# Patient Record
Sex: FEMALE | Race: WHITE | Employment: OTHER | URBAN - METROPOLITAN AREA
[De-identification: names, ages, dates, MRNs, and addresses within clinical notes are randomized per-mention and may not be internally consistent; named-entity substitution may affect disease eponyms.]

---

## 2017-01-04 RX ORDER — POTASSIUM CHLORIDE 750 MG/1
10 TABLET, FILM COATED, EXTENDED RELEASE ORAL
Qty: 13 TAB | Refills: 5 | Status: SHIPPED | OUTPATIENT
Start: 2017-01-04 | End: 2017-07-18 | Stop reason: SDUPTHER

## 2017-01-04 NOTE — TELEPHONE ENCOUNTER
From: Mirian Price  To: Natalee Bender MD  Sent: 1/2/2017 12:52 PM EST  Subject: Medication Renewal Request    Original authorizing provider: MD Mirian Bess would like a refill of the following medications:  potassium chloride SR (KLOR-CON 10) 10 mEq tablet Natalee Bender MD]    Preferred pharmacy: [address] [place], [place] [address]tim Baltazar    Comment:

## 2017-02-16 DIAGNOSIS — M79.7 FIBROMYALGIA: ICD-10-CM

## 2017-02-16 RX ORDER — PREGABALIN 25 MG/1
25 CAPSULE ORAL 2 TIMES DAILY
Qty: 60 CAP | Refills: 0 | Status: SHIPPED | OUTPATIENT
Start: 2017-02-16 | End: 2017-03-17 | Stop reason: SDUPTHER

## 2017-02-16 NOTE — TELEPHONE ENCOUNTER
From: Zahira Brown  To: Jude Day NP  Sent: 2/16/2017 11:36 AM EST  Subject: Medication Renewal Request    Original authorizing provider: EVI Rivera would like a refill of the following medications:  pregabalin (LYRICA) 25 mg capsule Jude Day NP]    Preferred pharmacy: [place] [address] AT [address]    Comment:

## 2017-02-16 NOTE — TELEPHONE ENCOUNTER
Last office visit 6/14/16  Last refill 12/19/16    Have placed \" make appt before next refill\" in Rx.

## 2017-02-27 DIAGNOSIS — F41.9 ANXIETY: ICD-10-CM

## 2017-02-27 RX ORDER — ALPRAZOLAM 1 MG/1
1 TABLET ORAL
Qty: 30 TAB | Refills: 0 | Status: SHIPPED | OUTPATIENT
Start: 2017-02-27 | End: 2017-05-04 | Stop reason: SDUPTHER

## 2017-02-27 NOTE — TELEPHONE ENCOUNTER
From: Gay Wen  To: Joana Branch NP  Sent: 2/27/2017 7:33 AM EST  Subject: Medication Renewal Request    Original authorizing provider: EVI Khan would like a refill of the following medications:  ALPRAZolam Juan Mike) 1 mg tablet Joana Branch NP]    Preferred pharmacy: [address] [place] [address] [address]    Comment:

## 2017-03-17 ENCOUNTER — HOSPITAL ENCOUNTER (OUTPATIENT)
Dept: LAB | Age: 82
Discharge: HOME | End: 2017-03-17
Payer: MEDICARE

## 2017-03-17 ENCOUNTER — OFFICE VISIT (OUTPATIENT)
Dept: INTERNAL MEDICINE CLINIC | Age: 82
End: 2017-03-17

## 2017-03-17 VITALS
WEIGHT: 166 LBS | BODY MASS INDEX: 30.55 KG/M2 | HEIGHT: 62 IN | TEMPERATURE: 98.1 F | RESPIRATION RATE: 16 BRPM | SYSTOLIC BLOOD PRESSURE: 130 MMHG | OXYGEN SATURATION: 95 % | DIASTOLIC BLOOD PRESSURE: 62 MMHG | HEART RATE: 57 BPM
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
## 2017-03-17 DIAGNOSIS — M25.511 ACUTE PAIN OF RIGHT SHOULDER: Primary | ICD-10-CM

## 2017-03-17 DIAGNOSIS — I10 ESSENTIAL HYPERTENSION: ICD-10-CM

## 2017-03-17 DIAGNOSIS — E53.8 B12 DEFICIENCY: ICD-10-CM

## 2017-03-17 DIAGNOSIS — R07.89 RIGHT-SIDED CHEST WALL PAIN: ICD-10-CM

## 2017-03-17 DIAGNOSIS — M79.7 FIBROMYALGIA: ICD-10-CM

## 2017-03-17 PROCEDURE — 80053 COMPREHEN METABOLIC PANEL: CPT

## 2017-03-17 PROCEDURE — 85027 COMPLETE CBC AUTOMATED: CPT

## 2017-03-17 PROCEDURE — 82607 VITAMIN B-12: CPT

## 2017-03-17 PROCEDURE — 36415 COLL VENOUS BLD VENIPUNCTURE: CPT

## 2017-03-17 RX ORDER — TRAMADOL HYDROCHLORIDE 50 MG/1
TABLET ORAL
Refills: 0 | COMMUNITY
Start: 2017-03-09 | End: 2017-03-17 | Stop reason: ALTCHOICE

## 2017-03-17 RX ORDER — PREGABALIN 25 MG/1
25 CAPSULE ORAL 2 TIMES DAILY
Qty: 60 CAP | Refills: 2 | Status: SHIPPED | OUTPATIENT
Start: 2017-03-23 | End: 2017-06-21

## 2017-03-17 RX ORDER — TRAMADOL HYDROCHLORIDE 50 MG/1
50 TABLET ORAL
Qty: 20 TAB | Refills: 0 | Status: SHIPPED | OUTPATIENT
Start: 2017-03-17 | End: 2018-04-11 | Stop reason: ALTCHOICE

## 2017-03-17 NOTE — MR AVS SNAPSHOT
Visit Information Date & Time Provider Department Dept. Phone Encounter #  
 3/17/2017 12:15 PM Adeola Leon MD [facility] [phone] [id number] Upcoming Health Maintenance Date Due  
 GLAUCOMA SCREENING Q2Y 8/13/1989 OSTEOPOROSIS SCREENING (DEXA) 8/13/1989 Pneumococcal 65+ High/Highest Risk (2 of 2 - PPSV23) 8/9/2016 MEDICARE YEARLY EXAM 6/15/2017 DTaP/Tdap/Td series (2 - Td) 6/14/2026 Allergies as of 3/17/2017  Review Complete On: 6/14/2016 By: Adeola Leon MD  
  
 Severity Noted Reaction Type Reactions Influenza Tri-split 08-09 Vac High 03/27/2012   Systemic Other (comments) High fever 104degrees Codeine  04/14/2010    Shortness of Breath Darvon [Propoxyphene]  04/14/2010    Shortness of Breath Morphine  04/14/2010    Palpitations Pcn [Penicillins]  04/14/2010    Hives Percocet [Oxycodone-acetaminophen]  05/04/2015    Unknown (comments) Says unsure was a long time ago Current Immunizations  Reviewed on 9/12/2014 No immunizations on file. Not reviewed this visit You Were Diagnosed With   
  
 Codes Comments Acute pain of right shoulder    -  Primary ICD-10-CM: M25.511 ICD-9-CM: 719.41 Right-sided chest wall pain     ICD-10-CM: R07.89 ICD-9-CM: 786.52 Fall, subsequent encounter     ICD-10-CM: W19. Fred  ICD-9-CM: V58.89, E888.9 Fibromyalgia     ICD-10-CM: M79.7 ICD-9-CM: 729.1 B12 deficiency     ICD-10-CM: E53.8 ICD-9-CM: 266.2 Essential hypertension     ICD-10-CM: I10 
ICD-9-CM: 401.9 Vitals BP Pulse Temp Resp Height(growth percentile) Weight(growth percentile) 130/62 (BP 1 Location: Right arm, BP Patient Position: Sitting) (!) 57 98.1 °F (36.7 °C) (Oral) 16 5' 2\" (1.575 m) 166 lb (75.3 kg) SpO2 BMI OB Status Smoking Status 95% 30.36 kg/m2 Postmenopausal Former Smoker BMI and BSA Data  Body Mass Index Body Surface Area  
 30.36 kg/m 2 1.81 m 2  
  
  
 Preferred Pharmacy Pharmacy Name Phone [address], [address] Your Updated Medication List  
  
   
This list is accurate as of: 3/17/17 12:56 PM.  Always use your most recent med list.  
  
  
  
  
 albuterol 2.5 mg /3 mL (0.083 %) nebulizer solution Commonly known as:  PROVENTIL VENTOLIN  
1.5 mL by Nebulization route every four (4) hours as needed for Wheezing. ALPRAZolam 1 mg tablet Commonly known as:  Aylin Furnace Take 1 Tab by mouth nightly as needed for Anxiety for up to 30 doses. aspirin delayed-release 81 mg tablet Take 81 mg by mouth daily. atorvastatin 10 mg tablet Commonly known as:  LIPITOR Take 1 Tab by mouth daily. chlorthalidone 25 mg tablet Commonly known as:  Marzena Stakes Take 1 Tab by mouth daily. Cholecalciferol (Vitamin D3) 50,000 unit Cap Take 1 Cap by mouth every fourteen (14) days. Indications: VITAMIN D DEFICIENCY  
  
 FLORASTOR 250 mg capsule Generic drug:  Saccharomyces boulardii Take 250 mg by mouth daily. furosemide 20 mg tablet Commonly known as:  LASIX  
3 times a week. iron gly,fum-C-Q22-ov-kebxncce 150 mg iron-200 mg-250 mcg Tab Commonly known as:  Yuniel Ty As directed. meclizine 25 mg tablet Commonly known as:  ANTIVERT Take  by mouth three (3) times daily as needed. mometasone 50 mcg/actuation nasal spray Commonly known as:  NASONEX  
USE 2 SPRAYS IN BOTH NOSTRILS DAILY AS NEEDED  
  
 omeprazole 20 mg capsule Commonly known as:  PRILOSEC Take 1 Cap by mouth two (2) times a day. potassium chloride SR 10 mEq tablet Commonly known as:  KLOR-CON 10 Take 1 Tab by mouth every Monday, Wednesday, Friday. pregabalin 25 mg capsule Commonly known as:  Edman Speaker Take 1 Cap by mouth two (2) times a day for 90 days.  Max Daily Amount: 50 mg. Indications: please make an appointment before next refill. Start taking on:  3/23/2017  
  
 traMADol 50 mg tablet Commonly known as:  ULTRAM  
Take 1 Tab by mouth every eight (8) hours as needed for Pain for up to 20 doses. Max Daily Amount: 150 mg.  
  
 TYLENOL 325 mg tablet Generic drug:  acetaminophen Take  by mouth every four (4) hours as needed for Pain. Prescriptions Printed Refills  
 pregabalin (LYRICA) 25 mg capsule 2 Starting on: 3/23/2017 Sig: Take 1 Cap by mouth two (2) times a day for 90 days. Max Daily Amount: 50 mg. Indications: please make an appointment before next refill. Class: Print Route: Oral  
 traMADol (ULTRAM) 50 mg tablet 0 Sig: Take 1 Tab by mouth every eight (8) hours as needed for Pain for up to 20 doses. Max Daily Amount: 150 mg.  
 Class: Print Route: Oral  
  
We Performed the Following CBC W/O DIFF [09599 CPT(R)] METABOLIC PANEL, COMPREHENSIVE [71925 CPT(R)] VITAMIN B12 & FOLATE [33236 CPT(R)] Introducing [facility] SERVICES! Dear Yoselin Be: Thank you for requesting a Mapp account. Our records indicate that you already have an active Mapp account. You can access your account anytime at [email or web address] Did you know that you can access your hospital and ER discharge instructions at any time in Mapp? You can also review all of your test results from your hospital stay or ER visit. Additional Information If you have questions, please visit the Frequently Asked Questions section of the Mapp website at [email or web address] Remember, Mapp is NOT to be used for urgent needs. For medical emergencies, dial 911. Now available from your iPhone and Android! Please provide this summary of care documentation to your next provider. Your primary care clinician is listed as [place].  If you have any questions after today's visit, please call [phone].

## 2017-03-17 NOTE — PROGRESS NOTES
Chief Complaint   Patient presents with    Follow-up     follow up on lyrica, was only taking it once a day, and now she is taking at night and day. it helps like that. Patient fell two weeks ago and ended up at [facility]. Bruised chest wall.

## 2017-03-17 NOTE — PROGRESS NOTES
Written by Mark Dang, as dictated by Dr. Conrad Khan MD.    Waqar Ewing is a 80 y.o. female. HPI  The patient comes in today for a follow up on Lyrica. She fell 2 weeks ago when she was getting out of bed to go to the bathroom and one of the handles on the commode broke and she fell between the bed and the commode. She used her lifeline and they sent someone out to pick her up who picked her up from her shoulders. She is having some R shoulder pain from where she hit the commode. On Thursday she went to the ED with R sided pain. She has a chest wall contusion and they took XRs, but there were no broken bones. She still has pain when she breathes deep. She has been using the spirometer to help with her breathing to prevent pneumonia. She was given Tramadol for her pain, and she takes 1 in the morning. She puts Aspercreme on her BL knees because of her arthritis, and it helps her a lot. She has been using the Aspercreme and patches on her shoulder because of her pain. She takes Xanax at night. She is going through a life stressor right now because her brother is dying and they have called hospice in to be with him and she cannot go visit. Patient Active Problem List   Diagnosis Code    Hypertension I10    Hyperlipidemia E78.5    Bladder cancer ([facility] C67.9    DJD (degenerative joint disease) M19.90    Hip fracture ([facility] S72.009A    Knee pain, right M25.561        Current Outpatient Prescriptions on File Prior to Visit   Medication Sig Dispense Refill    ALPRAZolam (XANAX) 1 mg tablet Take 1 Tab by mouth nightly as needed for Anxiety for up to 30 doses. 30 Tab 0    chlorthalidone (HYGROTEN) 25 mg tablet Take 1 Tab by mouth daily. 30 Tab 2    potassium chloride SR (KLOR-CON 10) 10 mEq tablet Take 1 Tab by mouth every Monday, Wednesday, Friday. 13 Tab 5    omeprazole (PRILOSEC) 20 mg capsule Take 1 Cap by mouth two (2) times a day.  60 Cap 3    atorvastatin (LIPITOR) 10 mg tablet Take 1 Tab by mouth daily. 90 Tab 0    furosemide (LASIX) 20 mg tablet 3 times a week. 30 Tab 2    mometasone (NASONEX) 50 mcg/actuation nasal spray USE 2 SPRAYS IN BOTH NOSTRILS DAILY AS NEEDED 1 Container 2    iron gly,fum-C-J34-qt-vmudpbin (MAXARON FORTE) 150 mg iron-200 mg-250 mcg tab As directed. 90 Tab 2    meclizine (ANTIVERT) 25 mg tablet Take  by mouth three (3) times daily as needed.  Cholecalciferol, Vitamin D3, 50,000 unit cap Take 1 Cap by mouth every fourteen (14) days. Indications: VITAMIN D DEFICIENCY 6 Cap 4    albuterol (PROVENTIL VENTOLIN) 2.5 mg /3 mL (0.083 %) nebulizer solution 1.5 mL by Nebulization route every four (4) hours as needed for Wheezing. 24 Each 1    acetaminophen (TYLENOL) 325 mg tablet Take  by mouth every four (4) hours as needed for Pain.  saccharomyces boulardii (FLORASTOR) 250 mg capsule Take 250 mg by mouth daily.  aspirin delayed-release 81 mg tablet Take 81 mg by mouth daily. No current facility-administered medications on file prior to visit.         Allergies   Allergen Reactions    Influenza Tri-Split 08-09 Vac Other (comments)     High fever 104degrees    Codeine Shortness of Breath    Darvon [Propoxyphene] Shortness of Breath    Morphine Palpitations    Pcn [Penicillins] Hives    Percocet [Oxycodone-Acetaminophen] Unknown (comments)     Says unsure was a long time ago       Past Medical History:   Diagnosis Date    Acute on chronic diastolic heart failure ([address] 3/27/2012    Arthritis     Calculus of kidney     Cancer ([address]     bladder    Cataract     in 2007 and implant inserted left eye    Cataract     left eye    Chronic pain     GERD (gastroesophageal reflux disease)     Heart attack ([address]     in 2002    Hypercholesterolemia     Thromboembolus [facility])        Past Surgical History:   Procedure Laterality Date    BREAST SURGERY PROCEDURE UNLISTED      CARDIAC SURG PROCEDURE UNLIST stent inserted 2002    HX CHOLECYSTECTOMY      HX GYN       x 4    HX HEENT      HX ORTHOPAEDIC      HX UROLOGICAL      found small tumor    HX WRIST FRACTURE [place]  Oct 2014       Family History   Problem Relation Age of Onset    Heart Disease Mother     Hypertension Mother     Heart Disease Brother     Cancer Brother     Other Brother      ortho    Heart Disease Maternal Aunt     Arthritis-rheumatoid Maternal Aunt     Heart Disease Maternal Uncle     Arthritis-rheumatoid Maternal Uncle     Heart Disease Maternal Grandmother     Stroke Maternal Grandmother     Kidney Disease         Social History     Social History    Marital status:      Spouse name: N/A    Number of children: N/A    Years of education: N/A     Occupational History    Not on file. Social History Main Topics    Smoking status: Former Smoker    Smokeless tobacco: Never Used      Comment: quit     Alcohol use No    Drug use: No    Sexual activity: Not Currently      Comment:      Other Topics Concern    Not on file     Social History Narrative           Review of Systems   Constitutional: Negative for malaise/fatigue. HENT: Negative for congestion. Respiratory: Negative for cough and wheezing. Cardiovascular: Negative for chest pain and palpitations. Genitourinary: Negative for frequency. Musculoskeletal: Positive for falls and joint pain. Negative for myalgias. Neurological: Negative for weakness and headaches. Visit Vitals    /62 (BP 1 Location: Right arm, BP Patient Position: Sitting)    Pulse (!) 57    Temp 98.1 °F (36.7 °C) (Oral)    Resp 16    Ht 5' 2\" (1.575 m)    Wt 166 lb (75.3 kg)    SpO2 95%    BMI 30.36 kg/m2     Physical Exam   Constitutional: She is oriented to person, place, and time. She appears well-nourished. No distress.    HENT:   Right Ear: External ear normal.   Left Ear: External ear normal.   Mouth/Throat: Oropharynx is clear and moist.   Eyes: Conjunctivae and EOM are normal. Right eye exhibits no discharge. Left eye exhibits no discharge. Neck: Normal range of motion. Neck supple. Cardiovascular: Normal rate and regular rhythm. Pulmonary/Chest: Effort normal and breath sounds normal. She has no wheezes. Abdominal: Soft. Bowel sounds are normal. She exhibits no distension. Lymphadenopathy:     She has no cervical adenopathy. Neurological: She is alert and oriented to person, place, and time. Skin: Skin is intact. Psychiatric: She has a normal mood and affect. Nursing note and vitals reviewed. ASSESSMENT and PLAN    ICD-10-CM ICD-9-CM    1. Acute pain of right shoulder M25.511 719.41 traMADol (ULTRAM) 50 mg tablet script given to patient. She can layer tylenol with the tramadol. 2. Right-sided chest wall pain R07.89 786.52 traMADol (ULTRAM) 50 mg tablet script given to patient    I discussed she can continue to use the patches on her shoulder and take the Tramadol once a day. discussed that she needs to continue with the deep breathing exercises in order to prevent pneumonia. 3. Fall, subsequent encounter W19. XXXD V58.89 Recommended getting up slowly from the bed & using walker carefully during the day. E888.9    4. Fibromyalgia M79.7 729.1 pregabalin (LYRICA) 25 mg capsule script given to patient. Since this is helping her so much I will give her another refill. 5. B12 deficiency E53.8 266.2 VITAMIN B12 & FOLATE    I will give her a B12 injection today after we check her B12.    6. Essential hypertension T09 100.3 METABOLIC PANEL, COMPREHENSIVE      CBC W/O DIFF    I discussed that we will check her level today. This plan was reviewed with the patient and patient agrees. All questions were answered. This scribe documentation was reviewed by me and accurately reflects the examination and decisions made by me. This note will not be viewable in [address]

## 2017-03-18 LAB
ALBUMIN SERPL-MCNC: 4 G/DL (ref 3.2–4.6)
ALBUMIN/GLOB SERPL: 1.7 {RATIO} (ref 1.2–2.2)
ALP SERPL-CCNC: 91 IU/L (ref 39–117)
ALT SERPL-CCNC: 5 IU/L (ref 0–32)
AST SERPL-CCNC: 12 IU/L (ref 0–40)
BILIRUB SERPL-MCNC: 0.5 MG/DL (ref 0–1.2)
BUN SERPL-MCNC: 34 MG/DL (ref 10–36)
BUN/CREAT SERPL: 21 (ref 11–26)
CALCIUM SERPL-MCNC: 9 MG/DL (ref 8.7–10.3)
CHLORIDE SERPL-SCNC: 100 MMOL/L (ref 96–106)
CO2 SERPL-SCNC: 25 MMOL/L (ref 18–29)
CREAT SERPL-MCNC: 1.59 MG/DL (ref 0.57–1)
ERYTHROCYTE [DISTWIDTH] IN BLOOD BY AUTOMATED COUNT: 14.2 % (ref 12.3–15.4)
FOLATE SERPL-MCNC: 10.8 NG/ML
GLOBULIN SER CALC-MCNC: 2.3 G/DL (ref 1.5–4.5)
GLUCOSE SERPL-MCNC: 88 MG/DL (ref 65–99)
HCT VFR BLD AUTO: 35.9 % (ref 34–46.6)
HGB BLD-MCNC: 12.1 G/DL (ref 11.1–15.9)
INTERPRETATION: NORMAL
MCH RBC QN AUTO: 30.4 PG (ref 26.6–33)
MCHC RBC AUTO-ENTMCNC: 33.7 G/DL (ref 31.5–35.7)
MCV RBC AUTO: 90 FL (ref 79–97)
PLATELET # BLD AUTO: 205 X10E3/UL (ref 150–379)
POTASSIUM SERPL-SCNC: 4.3 MMOL/L (ref 3.5–5.2)
PROT SERPL-MCNC: 6.3 G/DL (ref 6–8.5)
RBC # BLD AUTO: 3.98 X10E6/UL (ref 3.77–5.28)
SODIUM SERPL-SCNC: 140 MMOL/L (ref 134–144)
VIT B12 SERPL-MCNC: 790 PG/ML (ref 211–946)
WBC # BLD AUTO: 9.4 X10E3/UL (ref 3.4–10.8)

## 2017-03-21 NOTE — PROGRESS NOTES
Spoke to patient after making 2 identifications, made her aware of her lab results and the need to drink more water. Patient was upset on the phone when I asked what was wrong she stated her brother  last night and she can't go be with him. Asked patient if there was anything she needed which she replied no to. I will call patient back in a few days to check up on her.

## 2017-03-21 NOTE — PROGRESS NOTES
pls call her & find out how is she doing? If Aspercreme helping her knees. ? Her kidney functions are getting worse. She needs to drink 6-7 glasses of water.

## 2017-03-27 RX ORDER — ATORVASTATIN CALCIUM 10 MG/1
10 TABLET, FILM COATED ORAL DAILY
Qty: 90 TAB | Refills: 0 | Status: SHIPPED | OUTPATIENT
Start: 2017-03-27 | End: 2017-10-21 | Stop reason: SDUPTHER

## 2017-03-27 NOTE — TELEPHONE ENCOUNTER
From: Renato Andrews  To: Krystin Mcnair NP  Sent: 3/25/2017 12:37 PM EDT  Subject: Medication Renewal Request    Original authorizing provider: EVI Leung would like a refill of the following medications:  atorvastatin (LIPITOR) 10 mg tablet Krystin Mcnair NP]    Preferred pharmacy: [address] [place], [place] [address]tim Baltazar    Comment:

## 2017-04-07 DIAGNOSIS — E55.9 VITAMIN D DEFICIENCY: ICD-10-CM

## 2017-04-07 DIAGNOSIS — E53.8 B12 DEFICIENCY: ICD-10-CM

## 2017-04-07 RX ORDER — ASPIRIN 325 MG
50000 TABLET, DELAYED RELEASE (ENTERIC COATED) ORAL
Qty: 6 CAP | Refills: 4 | Status: SHIPPED | OUTPATIENT
Start: 2017-04-07 | End: 2018-01-01 | Stop reason: SDUPTHER

## 2017-04-07 RX ORDER — MAGNESIUM 200 MG
1000 TABLET ORAL DAILY
Qty: 90 TAB | Refills: 3 | Status: SHIPPED | OUTPATIENT
Start: 2017-04-07 | End: 2018-01-01 | Stop reason: SDUPTHER

## 2017-04-18 ENCOUNTER — OFFICE VISIT (OUTPATIENT)
Dept: INTERNAL MEDICINE CLINIC | Age: 82
End: 2017-04-18

## 2017-04-18 VITALS
TEMPERATURE: 98.4 F | HEART RATE: 58 BPM | HEIGHT: 62 IN | OXYGEN SATURATION: 98 % | BODY MASS INDEX: 30.73 KG/M2 | WEIGHT: 167 LBS | DIASTOLIC BLOOD PRESSURE: 84 MMHG | SYSTOLIC BLOOD PRESSURE: 122 MMHG | RESPIRATION RATE: 18 BRPM

## 2017-04-18 DIAGNOSIS — I10 ESSENTIAL HYPERTENSION: ICD-10-CM

## 2017-04-18 DIAGNOSIS — M17.0 PRIMARY OSTEOARTHRITIS OF BOTH KNEES: Primary | ICD-10-CM

## 2017-04-18 DIAGNOSIS — M25.511 CHRONIC PAIN OF BOTH SHOULDERS: ICD-10-CM

## 2017-04-18 DIAGNOSIS — G89.29 CHRONIC PAIN OF BOTH SHOULDERS: ICD-10-CM

## 2017-04-18 DIAGNOSIS — M25.512 CHRONIC PAIN OF BOTH SHOULDERS: ICD-10-CM

## 2017-04-18 NOTE — TELEPHONE ENCOUNTER
From: Lou Freeman  To: Lamont Mak NP  Sent: 4/17/2017 4:42 PM EDT  Subject: Medication Renewal Request    Original authorizing provider: EVI Hansen would like a refill of the following medications:  chlorthalidone (HYGROTEN) 25 mg tablet Lamont Mak NP]    Preferred pharmacy: [address] [place], [place] [address]tim Baltazar    Comment:

## 2017-04-18 NOTE — PROGRESS NOTES
Chief Complaint   Patient presents with    Knee Pain     knee injections     1. Have you been to the ER, urgent care clinic since your last visit? Hospitalized since your last visit? No    2. Have you seen or consulted any other health care providers outside of the [address] [place] [address] since your last visit? Include any pap smears or colon screening.  No

## 2017-04-18 NOTE — PROGRESS NOTES
Written by Sylvie Hughes, as dictated by Dr. Florentin Lange MD.    Allan Kaur is a 80 y.o. female. HPI  The patient comes in today C/O BL knee pain. Her shoulder pain is much better with the Aspercreme patches. Her creatinine was high at 1.59 on her last blood work. She had taken a diuretic the day prior to blood work. She is trying to drink more water. Her B.p readings have been running well within the normal range since on Hygroten. She is taking Lasix 2-3 times a week. Knee has been hurting a lot lately. She cannot take too many pain medication because of her elevated creatinine and her fall risk. She has been getting cortisol injections in the past with Dr. Kristina Conroy, but she does not want to go back to the orthopedist office because she does not want a knee replacement due to her age and quality of life. Patient Active Problem List   Diagnosis Code    Hypertension I10    Hyperlipidemia E78.5    Bladder cancer ([facility] [address] C67.9    DJD (degenerative joint disease) M19.90    Hip fracture ([facility] [address] S72.009A    Knee pain, right M25.561        Current Outpatient Prescriptions on File Prior to Visit   Medication Sig Dispense Refill    Cholecalciferol, Vitamin D3, 50,000 unit cap Take 1 Cap by mouth every fourteen (14) days. Indications: VITAMIN D DEFICIENCY 6 Cap 4    cyanocobalamin (VITAMIN B-12) 1,000 mcg sublingual tablet Take 1 Tab by mouth daily. 90 Tab 3    atorvastatin (LIPITOR) 10 mg tablet Take 1 Tab by mouth daily. 90 Tab 0    pregabalin (LYRICA) 25 mg capsule Take 1 Cap by mouth two (2) times a day for 90 days. Max Daily Amount: 50 mg. Indications: please make an appointment before next refill. 60 Cap 2    traMADol (ULTRAM) 50 mg tablet Take 1 Tab by mouth every eight (8) hours as needed for Pain for up to 20 doses. Max Daily Amount: 150 mg. 20 Tab 0    ALPRAZolam (XANAX) 1 mg tablet Take 1 Tab by mouth nightly as needed for Anxiety for up to 30 doses.  27 Tab 0    potassium chloride SR (KLOR-CON 10) 10 mEq tablet Take 1 Tab by mouth every Monday, Wednesday, Friday. 13 Tab 5    omeprazole (PRILOSEC) 20 mg capsule Take 1 Cap by mouth two (2) times a day. 60 Cap 3    furosemide (LASIX) 20 mg tablet 3 times a week. 30 Tab 2    mometasone (NASONEX) 50 mcg/actuation nasal spray USE 2 SPRAYS IN BOTH NOSTRILS DAILY AS NEEDED 1 Container 2    iron gly,fum-C-I23-ku-oveizbwc (MAXARON FORTE) 150 mg iron-200 mg-250 mcg tab As directed. 90 Tab 2    meclizine (ANTIVERT) 25 mg tablet Take  by mouth three (3) times daily as needed.  albuterol (PROVENTIL VENTOLIN) 2.5 mg /3 mL (0.083 %) nebulizer solution 1.5 mL by Nebulization route every four (4) hours as needed for Wheezing. 24 Each 1    acetaminophen (TYLENOL) 325 mg tablet Take  by mouth every four (4) hours as needed for Pain.  saccharomyces boulardii (FLORASTOR) 250 mg capsule Take 250 mg by mouth daily.  aspirin delayed-release 81 mg tablet Take 81 mg by mouth daily.  chlorthalidone (HYGROTEN) 25 mg tablet Take 1 Tab by mouth daily. 30 Tab 2     No current facility-administered medications on file prior to visit.         Allergies   Allergen Reactions    Influenza Tri-Split 08-09 Vac Other (comments)     High fever 104degrees    Codeine Shortness of Breath    Darvon [Propoxyphene] Shortness of Breath    Morphine Palpitations    Pcn [Penicillins] Hives    Percocet [Oxycodone-Acetaminophen] Unknown (comments)     Says unsure was a long time ago       Past Medical History:   Diagnosis Date    Acute on chronic diastolic heart failure ([address] 3/27/2012    Arthritis     Calculus of kidney     Cancer ([address]     bladder    Cataract     in 2007 and implant inserted left eye    Cataract     left eye    Chronic pain     GERD (gastroesophageal reflux disease)     Heart attack ([address]     in 2002    Hypercholesterolemia     Thromboembolus [facility])        Past Surgical History:   Procedure Laterality Date  BREAST SURGERY PROCEDURE UNLISTED      CARDIAC SURG PROCEDURE UNLIST      stent inserted 2002    HX CHOLECYSTECTOMY      HX GYN       x 4    HX HEENT      HX ORTHOPAEDIC      HX UROLOGICAL      found small tumor    HX WRIST FRACTURE [place]  Oct 2014       Family History   Problem Relation Age of Onset    Heart Disease Mother     Hypertension Mother     Heart Disease Brother     Cancer Brother     Other Brother      ortho    Heart Disease Maternal Aunt     Arthritis-rheumatoid Maternal Aunt     Heart Disease Maternal Uncle     Arthritis-rheumatoid Maternal Uncle     Heart Disease Maternal Grandmother     Stroke Maternal Grandmother     Kidney Disease Other        Social History     Social History    Marital status:      Spouse name: N/A    Number of children: N/A    Years of education: N/A     Occupational History    Not on file. Social History Main Topics    Smoking status: Former Smoker    Smokeless tobacco: Never Used      Comment: quit     Alcohol use No    Drug use: No    Sexual activity: Not Currently      Comment:      Other Topics Concern    Not on file     Social History Narrative       Office Visit on 2017   Component Date Value Ref Range Status    Vitamin B12 2017 790  211 - 946 pg/mL Final    Folate 2017 10.8  >3.0 ng/mL Final    Comment: A serum folate concentration of less than 3.1 ng/mL is  considered to represent clinical deficiency.       Glucose 2017 88  65 - 99 mg/dL Final    BUN 2017 34  10 - 36 mg/dL Final    Creatinine 2017 1.59* 0.57 - 1.00 mg/dL Final    GFR est non-AA 2017 28* >59 mL/min/1.73 Final    GFR est AA 2017 32* >59 mL/min/1.73 Final    BUN/Creatinine ratio 2017 21  11 - 26 Final    Sodium 2017 140  134 - 144 mmol/L Final    Potassium 2017 4.3  3.5 - 5.2 mmol/L Final    Chloride 2017 100  96 - 106 mmol/L Final    CO2 2017 25  18 - 29 mmol/L Final    Calcium 03/17/2017 9.0  8.7 - 10.3 mg/dL Final    Protein, total 03/17/2017 6.3  6.0 - 8.5 g/dL Final    Albumin 03/17/2017 4.0  3.2 - 4.6 g/dL Final    GLOBULIN, TOTAL 03/17/2017 2.3  1.5 - 4.5 g/dL Final    A-G Ratio 03/17/2017 1.7  1.2 - 2.2 Final                  **Please note reference interval change**    Bilirubin, total 03/17/2017 0.5  0.0 - 1.2 mg/dL Final    Alk. phosphatase 03/17/2017 91  39 - 117 IU/L Final    AST (SGOT) 03/17/2017 12  0 - 40 IU/L Final    ALT (SGPT) 03/17/2017 5  0 - 32 IU/L Final    WBC 03/17/2017 9.4  3.4 - 10.8 x10E3/uL Final    RBC 03/17/2017 3.98  3.77 - 5.28 x10E6/uL Final    HGB 03/17/2017 12.1  11.1 - 15.9 g/dL Final    HCT 03/17/2017 35.9  34.0 - 46.6 % Final    MCV 03/17/2017 90  79 - 97 fL Final    MCH 03/17/2017 30.4  26.6 - 33.0 pg Final    MCHC 03/17/2017 33.7  31.5 - 35.7 g/dL Final    RDW 03/17/2017 14.2  12.3 - 15.4 % Final    PLATELET 06/74/4879 504  150 - 379 x10E3/uL Final    Interpretation 03/17/2017 Note   Final    Supplement report is available. Review of Systems   Constitutional: Negative for malaise/fatigue. HENT: Negative for congestion. Respiratory: Negative for cough and wheezing. Cardiovascular: Negative for chest pain and palpitations. Genitourinary: Negative for frequency. Musculoskeletal: Positive for joint pain. Negative for myalgias. Neurological: Negative for weakness and headaches. Visit Vitals    /84    Pulse (!) 58    Temp 98.4 °F (36.9 °C) (Oral)    Resp 18    Ht 5' 2\" (1.575 m)    Wt 167 lb (75.8 kg)    SpO2 98%    BMI 30.54 kg/m2     Physical Exam   Constitutional: She is oriented to person, place, and time. She appears well-nourished. No distress. HENT:   Right Ear: External ear normal.   Left Ear: External ear normal.   Mouth/Throat: Oropharynx is clear and moist.   Eyes: Conjunctivae and EOM are normal. Right eye exhibits no discharge. Left eye exhibits no discharge. Neck: Normal range of motion. Neck supple. Cardiovascular: Normal rate and regular rhythm. Pulmonary/Chest: Effort normal and breath sounds normal. She has no wheezes. Abdominal: Soft. Bowel sounds are normal. She exhibits no distension. Musculoskeletal: She exhibits tenderness. BL crepitus knees  ROM limited due to pain   Lymphadenopathy:     She has no cervical adenopathy. Neurological: She is alert and oriented to person, place, and time. Skin: Skin is intact. Psychiatric: She has a normal mood and affect. Nursing note and vitals reviewed. ASSESSMENT and PLAN    ICD-10-CM ICD-9-CM    1. Primary osteoarthritis of both knees M17.0 715.16  Informed consent Obtained. Time out: Immediately prior to procedure a \"time out\" was called to verify the correct patient, procedure, equipment, support staff and site/side marked as required. Informed consent obtained. Under aseptic measure 1% lidocaine & 1% epinephrine was injected on R and L lower lateral side of patella to numb the area. 25 gauge needle was used ,no fluid came out. Depo-cortisol injected into BL lower lateral patella. No complication during this procedure. I suggested that she does warm compresses on her knees to help move the steroids around in her knee. 2. Essential hypertension I10 401.9 Her BP is well under control today. No change to dosage. This plan was reviewed with the patient and patient agrees. All questions were answered. This scribe documentation was reviewed by me and accurately reflects the examination and decisions made by me. This note will not be viewable in [address]

## 2017-04-19 RX ORDER — CHLORTHALIDONE 25 MG/1
25 TABLET ORAL DAILY
Qty: 30 TAB | Refills: 2 | Status: SHIPPED | OUTPATIENT
Start: 2017-04-19 | End: 2017-06-28 | Stop reason: ALTCHOICE

## 2017-05-04 DIAGNOSIS — F41.9 ANXIETY: ICD-10-CM

## 2017-05-04 RX ORDER — ALPRAZOLAM 1 MG/1
1 TABLET ORAL
Qty: 30 TAB | Refills: 0 | Status: SHIPPED | OUTPATIENT
Start: 2017-05-04 | End: 2017-06-24 | Stop reason: SDUPTHER

## 2017-05-04 NOTE — TELEPHONE ENCOUNTER
From: Tiffany Guzman  To: Otf Gill NP  Sent: 5/4/2017 10:15 AM EDT  Subject: Medication Renewal Request    Original authorizing provider: EVI Donaldson would like a refill of the following medications:  ALPRAZolam [place]) 1 mg tablet Otf Gill NP]    Preferred pharmacy: [address] [place], [place] [address]tim Baltazar    Comment:

## 2017-05-12 DIAGNOSIS — R42 VERTIGO: Primary | ICD-10-CM

## 2017-05-12 RX ORDER — MECLIZINE HYDROCHLORIDE 25 MG/1
25 TABLET ORAL
Qty: 90 TAB | Refills: 1 | Status: SHIPPED | OUTPATIENT
Start: 2017-05-12

## 2017-06-13 RX ORDER — OMEPRAZOLE 20 MG/1
20 CAPSULE, DELAYED RELEASE ORAL 2 TIMES DAILY
Qty: 60 CAP | Refills: 3 | Status: SHIPPED | OUTPATIENT
Start: 2017-06-13 | End: 2018-01-11 | Stop reason: SDUPTHER

## 2017-06-13 NOTE — TELEPHONE ENCOUNTER
From: Karin Castle  To: Tia Paz MD  Sent: 6/12/2017 3:56 PM EDT  Subject: Medication Renewal Request    Original authorizing provider: MD Karin Benedict would like a refill of the following medications:  omeprazole (PRILOSEC) 20 mg capsule Tia Paz MD]    Preferred pharmacy: [facility]    Comment:

## 2017-06-28 ENCOUNTER — OFFICE VISIT (OUTPATIENT)
Dept: INTERNAL MEDICINE CLINIC | Age: 82
End: 2017-06-28

## 2017-06-28 VITALS
HEIGHT: 62 IN | SYSTOLIC BLOOD PRESSURE: 114 MMHG | HEART RATE: 88 BPM | TEMPERATURE: 97.6 F | RESPIRATION RATE: 14 BRPM | WEIGHT: 153 LBS | OXYGEN SATURATION: 96 % | DIASTOLIC BLOOD PRESSURE: 66 MMHG | BODY MASS INDEX: 28.16 KG/M2

## 2017-06-28 DIAGNOSIS — I10 ESSENTIAL HYPERTENSION: ICD-10-CM

## 2017-06-28 DIAGNOSIS — Z00.00 MEDICARE ANNUAL WELLNESS VISIT, SUBSEQUENT: Primary | ICD-10-CM

## 2017-06-28 RX ORDER — PREGABALIN 25 MG/1
CAPSULE ORAL
Refills: 2 | COMMUNITY
Start: 2017-06-23 | End: 2017-07-19 | Stop reason: SINTOL

## 2017-06-28 RX ORDER — KETOROLAC TROMETHAMINE 5 MG/ML
SOLUTION OPHTHALMIC
Refills: 6 | COMMUNITY
Start: 2017-05-11

## 2017-06-28 RX ORDER — FLUTICASONE PROPIONATE 50 MCG
2 SPRAY, SUSPENSION (ML) NASAL DAILY
COMMUNITY
End: 2018-01-01 | Stop reason: ALTCHOICE

## 2017-06-28 RX ORDER — AMLODIPINE BESYLATE 2.5 MG/1
2.5 TABLET ORAL DAILY
Qty: 30 TAB | Refills: 2 | Status: SHIPPED | OUTPATIENT
Start: 2017-06-28 | End: 2017-09-26

## 2017-06-28 NOTE — PROGRESS NOTES
1. Have you been to the ER, urgent care clinic since your last visit? Hospitalized since your last visit? No    2. Have you seen or consulted any other health care providers outside of the [address] [place] [address] since your last visit? Include any pap smears or colon screening. No    Chief Complaint   Patient presents with   [facility] Annual Wellness Visit     Medicare wellness     Not fasting. Has not had DEXA screening.

## 2017-06-28 NOTE — PROGRESS NOTES
NN Medicare Wellness Visit      Opal Lopez is a 80 y.o. female and presents for Annual Medicare Wellness Visit. Assessment of cognitive impairment: Alert and oriented x 4. Depression Screen:   PHQ over the last two weeks 4/18/2017   Little interest or pleasure in doing things Not at all   Feeling down, depressed or hopeless Not at all   Total Score PHQ 2 0       Fall Risk Assessment:    Fall Risk Assessment, last 12 mths 6/28/2017   Able to walk? Yes   Fall in past 12 months? No   Fall with injury? -   Number of falls in past 12 months -   Fall Risk Score -       Abuse Screen:   Abuse Screening Questionnaire 6/28/2017   Do you ever feel afraid of your partner? N   Are you in a relationship with someone who physically or mentally threatens you? N   Is it safe for you to go home? Y       Activities of Daily Living:  Partial assistance. Requires assistance with: bathing and hygiene and no ADLs  Patient handle his/her own medications  yes Use of pill box  yes  Activities of Daily Living:   ADL Assessment 6/28/2017   Feeding yourself No Help Needed   Getting from bed to chair Help Needed   Getting dressed No Help Needed   Bathing or showering Help Needed   Walk across the room (includes cane/walker) Help Needed   Using the telphone No Help Needed   Taking your medications Help Needed   Preparing meals Help Needed   Managing money (expenses/bills) No Help Needed   Moderately strenuous housework (laundry) Help Needed   Shopping for personal items (toiletries/medicines) Help Needed   Shopping for groceries Help Needed   Driving Help Needed   Climbing a flight of stairs Help Needed   Getting to places beyond walking distances Help Needed       Health Maintenance:  Daily Aspirin: yes  Bone Density: pt decline  Glaucoma Screening: yes, 6/16/17  Immunizations:    Tetanus: patient declines. Influenza: patient declines. Shingles: pt declined. PPSV-23: pt declines. Prevnar-13: pt declines.     Cancer screening: Cervical: n/a. Breast: pt over 74. Colon: pt declines. Alcohol Risk Screen:   On any occasion during the past 3 months, have you had more than 3 drinks(female) or 4 drinks (male) containing alcohol in one? No  Do you average more than 7 drinks (female) or 14 drinks (male) per week? No  Type and amount:n/a    Hearing Loss:  wears hearing aides ( left and right)      Vision Loss:   Wears glasses, contact lenses, or have any other visual impairment  Yes, reading glasses    Adult Nutrition Screen:   pt reports reduces bread intake, lost some weight, pt reports sometimes chokes , pt feel like she is swallowing ok    Advance Care Planning:   End of Life Planning: pt says she did AMD with Marilia Snare last year? Will need to check record  Crow Dowell ACP-Facilitator appointment no      Medications/Allergies: Reviewed with patient  Prior to Admission medications    Medication Sig Start Date End Date Taking? Authorizing Provider   fluticasone (FLONASE) 50 mcg/actuation nasal spray 2 Sprays by Both Nostrils route daily. Yes Historical Provider   LYRICA 25 mg capsule  6/23/17  Yes Historical Provider   ketorolac (ACULAR) 0.5 % ophthalmic solution PLACE 1 GTT INTO OS D 5/11/17  Yes Historical Provider   ALPRAZolam (XANAX) 1 mg tablet Take 1 Tab by mouth nightly as needed for Anxiety for up to 30 doses. 6/26/17  Yes Rogers Barbosa NP   omeprazole (PRILOSEC) 20 mg capsule Take 1 Cap by mouth two (2) times a day. 6/13/17  Yes Alisia Watts NP   meclizine (ANTIVERT) 25 mg tablet Take 1 Tab by mouth three (3) times daily as needed. Chewable tabs 5/12/17  Yes Rogers Barbosa NP   chlorthalidone (HYGROTEN) 25 mg tablet Take 1 Tab by mouth daily. 4/19/17  Yes Alisia Watts NP   Cholecalciferol, Vitamin D3, 50,000 unit cap Take 1 Cap by mouth every fourteen (14) days.  Indications: VITAMIN D DEFICIENCY 4/7/17  Yes Rogers Barbosa NP   cyanocobalamin (VITAMIN B-12) 1,000 mcg sublingual tablet Take 1 Tab by mouth daily. 4/7/17  Yes Lissett Coats NP   atorvastatin (LIPITOR) 10 mg tablet Take 1 Tab by mouth daily. 3/27/17  Yes Lissett Coats NP   potassium chloride SR (KLOR-CON 10) 10 mEq tablet Take 1 Tab by mouth every Monday, Wednesday, Friday. 1/4/17  Yes Bernarda Hernandez MD   furosemide (LASIX) 20 mg tablet 3 times a week. 9/23/16  Yes Bernarda Hernandez MD   albuterol (PROVENTIL VENTOLIN) 2.5 mg /3 mL (0.083 %) nebulizer solution 1.5 mL by Nebulization route every four (4) hours as needed for Wheezing. 1/12/16  Yes Bernarda Hernandez MD   acetaminophen (TYLENOL) 325 mg tablet Take  by mouth every four (4) hours as needed for Pain. Yes Historical Provider   saccharomyces boulardii (FLORASTOR) 250 mg capsule Take 250 mg by mouth daily. Yes Historical Provider   aspirin delayed-release 81 mg tablet Take 81 mg by mouth daily. Yes Historical Provider   traMADol (ULTRAM) 50 mg tablet Take 1 Tab by mouth every eight (8) hours as needed for Pain for up to 20 doses. Max Daily Amount: 150 mg. 3/17/17   Bernarda Hernandez MD   mometasone (NASONEX) 50 mcg/actuation nasal spray USE 2 SPRAYS IN BOTH NOSTRILS DAILY AS NEEDED 9/14/16   Bernarda Hernandez MD   iron gly,fum-C-Q25-ht-bjgthlzd (MAXARON FORTE) 150 mg iron-200 mg-250 mcg tab As directed. 7/20/16   Bernarda Hernandez MD       Allergies   Allergen Reactions    Influenza Tri-Split 08-09 Vac Other (comments)     High fever 104degrees    Codeine Shortness of Breath    Darvon [Propoxyphene] Shortness of Breath    Morphine Palpitations    Pcn [Penicillins] Hives    Percocet [Oxycodone-Acetaminophen] Unknown (comments)     Says unsure was a long time ago       Objective:  Visit Vitals    /66 (BP 1 Location: Left arm, BP Patient Position: Sitting)    Pulse 88    Temp 97.6 °F (36.4 °C) (Oral)    Resp 14    Ht 5' 2\" (1.575 m)    Wt 153 lb (69.4 kg)    SpO2 96%    BMI 27.98 kg/m2    Body mass index is 27.98 kg/(m^2).     Problem List: Reviewed with patient and discussed risk factors. Patient Active Problem List   Diagnosis Code    Hypertension I10    Hyperlipidemia E78.5    Bladder cancer ([facility] [address] C67.9    DJD (degenerative joint disease) M19.90    Hip fracture ([facility] [address] S72.009A    Knee pain, right M25.561       PSH: Reviewed with patient  Past Surgical History:   Procedure Laterality Date    BREAST SURGERY PROCEDURE UNLISTED      CARDIAC SURG PROCEDURE UNLIST      stent inserted 2002    HX CHOLECYSTECTOMY      HX GYN       x 4    HX HEENT      HX ORTHOPAEDIC      HX UROLOGICAL      found small tumor    HX WRIST FRACTURE [address]  Oct 2014        SH: Reviewed with patient  Social History   Substance Use Topics    Smoking status: Former Smoker    Smokeless tobacco: Never Used      Comment: quit     Alcohol use No       FH: Reviewed with patient  Family History   Problem Relation Age of Onset    Heart Disease Mother     Hypertension Mother     Heart Disease Brother     Cancer Brother     Other Brother      ortho    Heart Disease Maternal Aunt     Arthritis-rheumatoid Maternal Aunt     Heart Disease Maternal Uncle     Arthritis-rheumatoid Maternal Uncle     Heart Disease Maternal Grandmother     Stroke Maternal Grandmother     Kidney Disease Other        Current medical providers:    Patient Care Team:  Yobani Pedraza MD as PCP - General  Dr. Parul Omalley, urologist.    Plan:    Ld Hernandez was seen today for annual wellness visit. Diagnoses and all orders for this visit:    Medicare annual wellness visit, subsequent    Essential hypertension  -     amLODIPine (NORVASC) 2.5 mg tablet; Take 1 Tab by mouth daily for 90 days. Chlorthalidone discontinued due to elevated creatinine & low readings.         Orders Placed This Encounter    fluticasone (FLONASE) 50 mcg/actuation nasal spray    LYRICA 25 mg capsule    ketorolac (ACULAR) 0.5 % ophthalmic solution       Health Maintenance   Topic Date Due    GLAUCOMA SCREENING Q2Y  1989    OSTEOPOROSIS SCREENING (DEXA)  08/13/1989    MEDICARE YEARLY EXAM  06/15/2017    INFLUENZA AGE 9 TO ADULT  08/01/2017    DTaP/Tdap/Td series (2 - Td) 06/14/2026    ZOSTER VACCINE AGE 60>  Addressed    Pneumococcal 65+ High/Highest Risk  Addressed          Urinary/ Fecal Incontinence: sometimes per pt. Regular physical exercise: none due to knee osteoarthritis. Patient verbalized understanding of information presented. AVS and Medicare Part B Preventive Services Table printed and given to pt and reviewed. See table for findings under Recommendation and Scheduled. All of the patient's questions were answered.

## 2017-06-28 NOTE — PATIENT INSTRUCTIONS
Today's Date - 6/28/17  Medicare Part B Preventive Services Limitations Recommendation/Date completed if known Scheduled/ Next Due   Bone Mass Measurement  (age 72 & older, biennial) Requires diagnosis related to osteoporosis or estrogen deficiency. Biennial benefit unless patient has history of long-term glucocorticoid tx or baseline is needed because initial test was by other method Completed:      Recommended every 2 years DUE: pt declined   Cardiovascular Screening Blood Tests (every 5 years)  Total cholesterol, HDL, Triglycerides Order as a panel if possible Completed:      Recommended annually DUE:due now   Colorectal Cancer Screening  -Fecal occult blood test (annual)  -Flexible sigmoidoscopy (5y)  -Screening colonoscopy (10y)  -Barium Enema  Completed:        Recommended every 10 years DUE over 74:   Counseling to Prevent Tobacco Use (up to 8 sessions per year)  - Counseling greater than 3 and up to 10 minutes  - Counseling greater than 10 minutes Patients must be asymptomatic of tobacco-related conditions to receive as preventive service  n/a   Prevnar 13 vaccine       Pt declined     TDAP Vaccine     Pt declined   Shingles Vaccine         Pt declined   Influenza Vaecine        Due fall   Pneumonia Vaccine       Pt declined   Diabetes Screening Tests (at least every 3 years, Medicare covers annually or at 6-month intervals for prediabetic patients)    Fasting blood sugar (FBS) or glucose tolerance test (GTT)       Patient must be diagnosed with one of the following:  -Hypertension, Dyslipidemia, obesity, previous impaired FBS or GTT  Or any two of the following: overweight, FH of diabetes, age ? 72, history of gestational diabetes, birth of baby weighing more than 9 pounds Completed:        Recommended annually DUE: n/a   Diabetes Self-Management Training (DSMT) (no USPSTF recommendation) Requires referral by treating physician for patient with diabetes or renal disease.  10 hours of initial DSMT session of no less than 30 minutes each in a continuous 12-month period. 2 hours of follow-up DSMT in subsequent years. n/a   Glaucoma Screening (no USPSTF recommendation) Diabetes mellitus, family history, , age 48 or over,  American, age 72 or over Completed:        Recommended annually DUE: due now   Human Immunodeficiency Virus (HIV) Screening (annually for increased risk patients)  HIV-1 and HIV-2 by EIA, NEDRA, rapid antibody test, or oral mucosa transudate Patient must be at increased risk for HIV infection per USPSTF guidelines or pregnant. Tests covered annually for patients at increased risk. Pregnant patients may receive up to 3 test during pregnancy. n/a   Medical Nutrition Therapy (MNT) (fordiabetes or renal disease not recommended schedule) Requires referral by treating physician for patient with diabetes or renal disease. Can be provided in same year as diabetes self-management training (DSMT), and CMS recommends medical nutrition therapy take place after DSMT. Up to 3 hours for initial year and 2 hours in subsequent years. n/a   Hepatitis B Vaccinations (if medium/high risk) Medium/high risk factors:  End-stage renal disease,  Hemophiliacs who received Factor VIII or IX concentrates, Clients of institutions for the mentally retarded, Persons who live in the same house as a HepB virus carrier, Homosexual men, Illicit injectable drug abusers. n/a   Screening Mammography (biennial age 54-69)? Annually (age 36 or over) Completed:       Recommended annually DUE: pt 80   Screening Pap Tests and Pelvic Examination (up to age 79 and after 79 if unknown history or abnormal study last 10 years) Every 24 months except high risk Completed:        Recommended every 2 years DUE: pt 80     Ultrasound Screening for Abdominal Aortic Aneurysm (AAA) (once)   Patient must be referred through IPPE and not have had a screening for abdominal aortic aneurysm before under Medicare.   Limited to patients who meet one of the following criteria:  - Men who are 73-68 years old and have smoked more than 100 cigarettes in their lifetime.  -Anyone with a FH of AAA  -Anyone recommended for screening by USPSTF   Not covered by   Medicare as preventive. n/a   Thanks for coming in today. It was nice to spend some time with you. If you have any questions about your visit today, please call [phone] and ask to speak with Mic Celestni.

## 2017-07-18 ENCOUNTER — HOME HEALTH ADMISSION (OUTPATIENT)
Dept: HOME HEALTH SERVICES | Facility: HOME HEALTH | Age: 82
End: 2017-07-18
Payer: MEDICARE

## 2017-07-18 DIAGNOSIS — M17.0 PRIMARY OSTEOARTHRITIS OF BOTH KNEES: ICD-10-CM

## 2017-07-18 DIAGNOSIS — R26.81 UNSTEADY GAIT: ICD-10-CM

## 2017-07-18 DIAGNOSIS — G89.29 CHRONIC PAIN OF RIGHT KNEE: Primary | ICD-10-CM

## 2017-07-18 DIAGNOSIS — M25.561 CHRONIC PAIN OF RIGHT KNEE: Primary | ICD-10-CM

## 2017-07-18 DIAGNOSIS — R53.1 WEAKNESS: ICD-10-CM

## 2017-07-19 ENCOUNTER — HOME CARE VISIT (OUTPATIENT)
Dept: HOME HEALTH SERVICES | Facility: HOME HEALTH | Age: 82
End: 2017-07-19
Payer: MEDICARE

## 2017-07-19 ENCOUNTER — HOME CARE VISIT (OUTPATIENT)
Dept: SCHEDULING | Facility: HOME HEALTH | Age: 82
End: 2017-07-19
Payer: MEDICARE

## 2017-07-19 ENCOUNTER — HOME CARE VISIT (OUTPATIENT)
Dept: HOME HEALTH SERVICES | Facility: HOME HEALTH | Age: 82
End: 2017-07-19

## 2017-07-19 PROCEDURE — G0151 HHCP-SERV OF PT,EA 15 MIN: HCPCS

## 2017-07-19 PROCEDURE — 3331090001 HH PPS REVENUE CREDIT

## 2017-07-19 PROCEDURE — 3331090002 HH PPS REVENUE DEBIT

## 2017-07-19 PROCEDURE — 400013 HH SOC

## 2017-07-20 ENCOUNTER — HOME CARE VISIT (OUTPATIENT)
Dept: SCHEDULING | Facility: HOME HEALTH | Age: 82
End: 2017-07-20
Payer: MEDICARE

## 2017-07-20 VITALS
RESPIRATION RATE: 16 BRPM | OXYGEN SATURATION: 98 % | DIASTOLIC BLOOD PRESSURE: 80 MMHG | HEART RATE: 68 BPM | TEMPERATURE: 97.8 F | SYSTOLIC BLOOD PRESSURE: 138 MMHG

## 2017-07-20 VITALS
RESPIRATION RATE: 16 BRPM | HEART RATE: 70 BPM | DIASTOLIC BLOOD PRESSURE: 80 MMHG | OXYGEN SATURATION: 96 % | SYSTOLIC BLOOD PRESSURE: 130 MMHG | TEMPERATURE: 98 F

## 2017-07-20 PROCEDURE — 3331090002 HH PPS REVENUE DEBIT

## 2017-07-20 PROCEDURE — 3331090001 HH PPS REVENUE CREDIT

## 2017-07-20 PROCEDURE — G0152 HHCP-SERV OF OT,EA 15 MIN: HCPCS

## 2017-07-21 PROCEDURE — 3331090001 HH PPS REVENUE CREDIT

## 2017-07-21 PROCEDURE — 3331090002 HH PPS REVENUE DEBIT

## 2017-07-22 PROCEDURE — 3331090001 HH PPS REVENUE CREDIT

## 2017-07-22 PROCEDURE — 3331090002 HH PPS REVENUE DEBIT

## 2017-07-23 PROCEDURE — 3331090002 HH PPS REVENUE DEBIT

## 2017-07-23 PROCEDURE — 3331090001 HH PPS REVENUE CREDIT

## 2017-07-24 ENCOUNTER — HOME CARE VISIT (OUTPATIENT)
Dept: SCHEDULING | Facility: HOME HEALTH | Age: 82
End: 2017-07-24
Payer: MEDICARE

## 2017-07-24 ENCOUNTER — HOME CARE VISIT (OUTPATIENT)
Dept: HOME HEALTH SERVICES | Facility: HOME HEALTH | Age: 82
End: 2017-07-24
Payer: MEDICARE

## 2017-07-24 VITALS
DIASTOLIC BLOOD PRESSURE: 70 MMHG | HEART RATE: 68 BPM | OXYGEN SATURATION: 98 % | RESPIRATION RATE: 16 BRPM | TEMPERATURE: 97.8 F | SYSTOLIC BLOOD PRESSURE: 140 MMHG

## 2017-07-24 PROCEDURE — 3331090002 HH PPS REVENUE DEBIT

## 2017-07-24 PROCEDURE — G0152 HHCP-SERV OF OT,EA 15 MIN: HCPCS

## 2017-07-24 PROCEDURE — 3331090001 HH PPS REVENUE CREDIT

## 2017-07-25 ENCOUNTER — HOME CARE VISIT (OUTPATIENT)
Dept: SCHEDULING | Facility: HOME HEALTH | Age: 82
End: 2017-07-25
Payer: MEDICARE

## 2017-07-25 VITALS
HEART RATE: 65 BPM | RESPIRATION RATE: 16 BRPM | SYSTOLIC BLOOD PRESSURE: 130 MMHG | DIASTOLIC BLOOD PRESSURE: 80 MMHG | OXYGEN SATURATION: 97 %

## 2017-07-25 PROCEDURE — G0151 HHCP-SERV OF PT,EA 15 MIN: HCPCS

## 2017-07-25 PROCEDURE — 3331090002 HH PPS REVENUE DEBIT

## 2017-07-25 PROCEDURE — 3331090001 HH PPS REVENUE CREDIT

## 2017-07-26 ENCOUNTER — HOME CARE VISIT (OUTPATIENT)
Dept: SCHEDULING | Facility: HOME HEALTH | Age: 82
End: 2017-07-26
Payer: MEDICARE

## 2017-07-26 PROCEDURE — G0152 HHCP-SERV OF OT,EA 15 MIN: HCPCS

## 2017-07-26 PROCEDURE — 3331090001 HH PPS REVENUE CREDIT

## 2017-07-26 PROCEDURE — 3331090002 HH PPS REVENUE DEBIT

## 2017-07-27 ENCOUNTER — HOME CARE VISIT (OUTPATIENT)
Dept: SCHEDULING | Facility: HOME HEALTH | Age: 82
End: 2017-07-27
Payer: MEDICARE

## 2017-07-27 VITALS
OXYGEN SATURATION: 97 % | RESPIRATION RATE: 18 BRPM | SYSTOLIC BLOOD PRESSURE: 139 MMHG | DIASTOLIC BLOOD PRESSURE: 70 MMHG | TEMPERATURE: 98.2 F | HEART RATE: 60 BPM

## 2017-07-27 VITALS — RESPIRATION RATE: 16 BRPM

## 2017-07-27 PROCEDURE — 3331090002 HH PPS REVENUE DEBIT

## 2017-07-27 PROCEDURE — 3331090001 HH PPS REVENUE CREDIT

## 2017-07-27 PROCEDURE — G0151 HHCP-SERV OF PT,EA 15 MIN: HCPCS

## 2017-07-28 ENCOUNTER — HOME CARE VISIT (OUTPATIENT)
Dept: HOME HEALTH SERVICES | Facility: HOME HEALTH | Age: 82
End: 2017-07-28
Payer: MEDICARE

## 2017-07-28 PROCEDURE — 3331090001 HH PPS REVENUE CREDIT

## 2017-07-28 PROCEDURE — 3331090002 HH PPS REVENUE DEBIT

## 2017-07-29 PROCEDURE — 3331090001 HH PPS REVENUE CREDIT

## 2017-07-29 PROCEDURE — 3331090002 HH PPS REVENUE DEBIT

## 2017-07-30 PROCEDURE — 3331090002 HH PPS REVENUE DEBIT

## 2017-07-30 PROCEDURE — 3331090001 HH PPS REVENUE CREDIT

## 2017-07-31 ENCOUNTER — HOME CARE VISIT (OUTPATIENT)
Dept: SCHEDULING | Facility: HOME HEALTH | Age: 82
End: 2017-07-31
Payer: MEDICARE

## 2017-07-31 VITALS
HEART RATE: 64 BPM | OXYGEN SATURATION: 97 % | TEMPERATURE: 98 F | RESPIRATION RATE: 16 BRPM | DIASTOLIC BLOOD PRESSURE: 80 MMHG | SYSTOLIC BLOOD PRESSURE: 160 MMHG

## 2017-07-31 PROCEDURE — 3331090002 HH PPS REVENUE DEBIT

## 2017-07-31 PROCEDURE — G0152 HHCP-SERV OF OT,EA 15 MIN: HCPCS

## 2017-07-31 PROCEDURE — 3331090001 HH PPS REVENUE CREDIT

## 2017-08-01 ENCOUNTER — HOME CARE VISIT (OUTPATIENT)
Dept: SCHEDULING | Facility: HOME HEALTH | Age: 82
End: 2017-08-01
Payer: MEDICARE

## 2017-08-01 VITALS
RESPIRATION RATE: 16 BRPM | SYSTOLIC BLOOD PRESSURE: 138 MMHG | HEART RATE: 67 BPM | DIASTOLIC BLOOD PRESSURE: 80 MMHG | OXYGEN SATURATION: 97 %

## 2017-08-01 PROCEDURE — 3331090001 HH PPS REVENUE CREDIT

## 2017-08-01 PROCEDURE — 3331090002 HH PPS REVENUE DEBIT

## 2017-08-01 PROCEDURE — G0151 HHCP-SERV OF PT,EA 15 MIN: HCPCS

## 2017-08-02 ENCOUNTER — HOME CARE VISIT (OUTPATIENT)
Dept: SCHEDULING | Facility: HOME HEALTH | Age: 82
End: 2017-08-02
Payer: MEDICARE

## 2017-08-02 PROCEDURE — 3331090002 HH PPS REVENUE DEBIT

## 2017-08-02 PROCEDURE — 3331090001 HH PPS REVENUE CREDIT

## 2017-08-02 PROCEDURE — G0152 HHCP-SERV OF OT,EA 15 MIN: HCPCS

## 2017-08-03 ENCOUNTER — HOME CARE VISIT (OUTPATIENT)
Dept: SCHEDULING | Facility: HOME HEALTH | Age: 82
End: 2017-08-03
Payer: MEDICARE

## 2017-08-03 VITALS
SYSTOLIC BLOOD PRESSURE: 138 MMHG | RESPIRATION RATE: 20 BRPM | DIASTOLIC BLOOD PRESSURE: 62 MMHG | OXYGEN SATURATION: 98 % | HEART RATE: 66 BPM | TEMPERATURE: 97.8 F

## 2017-08-03 VITALS
HEART RATE: 70 BPM | OXYGEN SATURATION: 97 % | SYSTOLIC BLOOD PRESSURE: 148 MMHG | RESPIRATION RATE: 16 BRPM | DIASTOLIC BLOOD PRESSURE: 80 MMHG

## 2017-08-03 PROCEDURE — G0151 HHCP-SERV OF PT,EA 15 MIN: HCPCS

## 2017-08-03 PROCEDURE — 3331090002 HH PPS REVENUE DEBIT

## 2017-08-03 PROCEDURE — 3331090001 HH PPS REVENUE CREDIT

## 2017-08-04 PROCEDURE — 3331090001 HH PPS REVENUE CREDIT

## 2017-08-04 PROCEDURE — 3331090002 HH PPS REVENUE DEBIT

## 2017-08-05 PROCEDURE — 3331090001 HH PPS REVENUE CREDIT

## 2017-08-05 PROCEDURE — 3331090002 HH PPS REVENUE DEBIT

## 2017-08-06 PROCEDURE — 3331090001 HH PPS REVENUE CREDIT

## 2017-08-06 PROCEDURE — 3331090002 HH PPS REVENUE DEBIT

## 2017-08-07 ENCOUNTER — HOME CARE VISIT (OUTPATIENT)
Dept: SCHEDULING | Facility: HOME HEALTH | Age: 82
End: 2017-08-07
Payer: MEDICARE

## 2017-08-07 VITALS
SYSTOLIC BLOOD PRESSURE: 124 MMHG | OXYGEN SATURATION: 96 % | TEMPERATURE: 98 F | RESPIRATION RATE: 18 BRPM | HEART RATE: 62 BPM | DIASTOLIC BLOOD PRESSURE: 65 MMHG

## 2017-08-07 PROCEDURE — G0152 HHCP-SERV OF OT,EA 15 MIN: HCPCS

## 2017-08-07 PROCEDURE — 3331090001 HH PPS REVENUE CREDIT

## 2017-08-07 PROCEDURE — 3331090002 HH PPS REVENUE DEBIT

## 2017-08-08 ENCOUNTER — HOME CARE VISIT (OUTPATIENT)
Dept: SCHEDULING | Facility: HOME HEALTH | Age: 82
End: 2017-08-08
Payer: MEDICARE

## 2017-08-08 VITALS — RESPIRATION RATE: 16 BRPM | SYSTOLIC BLOOD PRESSURE: 126 MMHG | DIASTOLIC BLOOD PRESSURE: 80 MMHG

## 2017-08-08 PROCEDURE — G0151 HHCP-SERV OF PT,EA 15 MIN: HCPCS

## 2017-08-08 PROCEDURE — 3331090001 HH PPS REVENUE CREDIT

## 2017-08-08 PROCEDURE — 3331090002 HH PPS REVENUE DEBIT

## 2017-08-09 PROCEDURE — 3331090001 HH PPS REVENUE CREDIT

## 2017-08-09 PROCEDURE — 3331090002 HH PPS REVENUE DEBIT

## 2017-08-10 ENCOUNTER — HOME CARE VISIT (OUTPATIENT)
Dept: SCHEDULING | Facility: HOME HEALTH | Age: 82
End: 2017-08-10
Payer: MEDICARE

## 2017-08-10 VITALS
HEART RATE: 57 BPM | DIASTOLIC BLOOD PRESSURE: 75 MMHG | OXYGEN SATURATION: 96 % | SYSTOLIC BLOOD PRESSURE: 122 MMHG | RESPIRATION RATE: 16 BRPM

## 2017-08-10 PROCEDURE — 3331090002 HH PPS REVENUE DEBIT

## 2017-08-10 PROCEDURE — G0151 HHCP-SERV OF PT,EA 15 MIN: HCPCS

## 2017-08-10 PROCEDURE — 3331090001 HH PPS REVENUE CREDIT

## 2017-08-11 PROCEDURE — 3331090001 HH PPS REVENUE CREDIT

## 2017-08-11 PROCEDURE — 3331090002 HH PPS REVENUE DEBIT

## 2017-08-12 PROCEDURE — 3331090002 HH PPS REVENUE DEBIT

## 2017-08-12 PROCEDURE — 3331090001 HH PPS REVENUE CREDIT

## 2017-08-13 PROCEDURE — 3331090002 HH PPS REVENUE DEBIT

## 2017-08-13 PROCEDURE — 3331090001 HH PPS REVENUE CREDIT

## 2017-08-14 PROCEDURE — 3331090001 HH PPS REVENUE CREDIT

## 2017-08-14 PROCEDURE — 3331090002 HH PPS REVENUE DEBIT

## 2017-08-15 ENCOUNTER — HOME CARE VISIT (OUTPATIENT)
Dept: SCHEDULING | Facility: HOME HEALTH | Age: 82
End: 2017-08-15
Payer: MEDICARE

## 2017-08-15 VITALS
HEART RATE: 80 BPM | SYSTOLIC BLOOD PRESSURE: 130 MMHG | TEMPERATURE: 98 F | RESPIRATION RATE: 16 BRPM | OXYGEN SATURATION: 97 % | DIASTOLIC BLOOD PRESSURE: 80 MMHG

## 2017-08-15 PROCEDURE — G0151 HHCP-SERV OF PT,EA 15 MIN: HCPCS

## 2017-08-15 PROCEDURE — 3331090002 HH PPS REVENUE DEBIT

## 2017-08-15 PROCEDURE — 3331090001 HH PPS REVENUE CREDIT

## 2017-08-16 PROCEDURE — 3331090001 HH PPS REVENUE CREDIT

## 2017-08-16 PROCEDURE — 3331090002 HH PPS REVENUE DEBIT

## 2017-08-17 ENCOUNTER — HOME CARE VISIT (OUTPATIENT)
Dept: SCHEDULING | Facility: HOME HEALTH | Age: 82
End: 2017-08-17
Payer: MEDICARE

## 2017-08-17 VITALS — RESPIRATION RATE: 16 BRPM | SYSTOLIC BLOOD PRESSURE: 133 MMHG | DIASTOLIC BLOOD PRESSURE: 70 MMHG

## 2017-08-17 PROCEDURE — 3331090002 HH PPS REVENUE DEBIT

## 2017-08-17 PROCEDURE — 3331090003 HH PPS REVENUE ADJ

## 2017-08-17 PROCEDURE — G0151 HHCP-SERV OF PT,EA 15 MIN: HCPCS

## 2017-08-17 PROCEDURE — 3331090001 HH PPS REVENUE CREDIT

## 2017-08-18 PROCEDURE — 3331090001 HH PPS REVENUE CREDIT

## 2017-08-18 PROCEDURE — 3331090002 HH PPS REVENUE DEBIT

## 2017-08-19 PROCEDURE — 3331090002 HH PPS REVENUE DEBIT

## 2017-08-19 PROCEDURE — 3331090001 HH PPS REVENUE CREDIT

## 2017-08-20 PROCEDURE — 3331090002 HH PPS REVENUE DEBIT

## 2017-08-20 PROCEDURE — 3331090001 HH PPS REVENUE CREDIT

## 2017-08-21 PROCEDURE — 3331090001 HH PPS REVENUE CREDIT

## 2017-08-21 PROCEDURE — 3331090002 HH PPS REVENUE DEBIT

## 2017-08-22 PROCEDURE — 3331090001 HH PPS REVENUE CREDIT

## 2017-08-22 PROCEDURE — 3331090002 HH PPS REVENUE DEBIT

## 2017-08-28 ENCOUNTER — HOSPITAL ENCOUNTER (OUTPATIENT)
Dept: LAB | Age: 82
Discharge: HOME | End: 2017-08-28
Payer: MEDICARE

## 2017-08-28 ENCOUNTER — OFFICE VISIT (OUTPATIENT)
Dept: INTERNAL MEDICINE CLINIC | Age: 82
End: 2017-08-28

## 2017-08-28 VITALS
WEIGHT: 158.8 LBS | OXYGEN SATURATION: 95 % | RESPIRATION RATE: 16 BRPM | DIASTOLIC BLOOD PRESSURE: 56 MMHG | HEIGHT: 62 IN | SYSTOLIC BLOOD PRESSURE: 118 MMHG | HEART RATE: 56 BPM | TEMPERATURE: 97.6 F | BODY MASS INDEX: 29.22 KG/M2

## 2017-08-28 DIAGNOSIS — F41.9 ANXIETY: ICD-10-CM

## 2017-08-28 DIAGNOSIS — R79.89 ELEVATED SERUM CREATININE: ICD-10-CM

## 2017-08-28 DIAGNOSIS — I10 ESSENTIAL HYPERTENSION: Primary | ICD-10-CM

## 2017-08-28 DIAGNOSIS — R53.82 CHRONIC FATIGUE: ICD-10-CM

## 2017-08-28 PROCEDURE — 85027 COMPLETE CBC AUTOMATED: CPT

## 2017-08-28 PROCEDURE — 36415 COLL VENOUS BLD VENIPUNCTURE: CPT

## 2017-08-28 PROCEDURE — 80048 BASIC METABOLIC PNL TOTAL CA: CPT

## 2017-08-28 RX ORDER — ALPRAZOLAM 1 MG/1
1 TABLET ORAL
Qty: 30 TAB | Refills: 0 | Status: CANCELLED | OUTPATIENT
Start: 2017-08-28

## 2017-08-28 RX ORDER — ALPRAZOLAM 1 MG/1
1 TABLET ORAL
Qty: 20 TAB | Refills: 0 | Status: SHIPPED | OUTPATIENT
Start: 2017-08-28 | End: 2017-10-06 | Stop reason: SDUPTHER

## 2017-08-28 NOTE — PROGRESS NOTES
Written by [facility], as dictated by Dr. Jah Hinton MD.    Isreal Ventura is a 80 y.o. female. HPI  The patient comes in today for a follow-up. Her BP is 118/56 today and has been running normal at home. She is compliant on 1/2 tablet chlorthalidone and has been urinating less frequently. She has not been taking Lasix anymore. She has been taking Lyrica three times per week, which helps. She has been feeling dizzy lately and is worried she may fall, as she fell getting out of her bed. She takes meclizine prn. She has been doing PT at home and found that it helped a bit, but she has not been consistent doing her exercises. She has not been taking iron supplements. She has been experiencing frequent runny nose. She uses 1/2 tablet Xanax for anxiety, but does not feel like it has helped much. Her daughter has been giving her every night. Patient Active Problem List   Diagnosis Code    Hypertension I10    Hyperlipidemia E78.5    Bladder cancer ([facility] C67.9    DJD (degenerative joint disease) M19.90    Hip fracture ([facility] S72.009A    Knee pain, right M25.561        Current Outpatient Prescriptions on File Prior to Visit   Medication Sig Dispense Refill    chlorthalidone (HYGROTEN) 25 mg tablet Take 12 mg by mouth daily. Pt instructed to take half of the 25 mg pill.  LYRICA 25 mg capsule Take 25 mg by mouth every Monday, Wednesday, Friday. 2    potassium chloride SR (KLOR-CON 10) 10 mEq tablet Take 1 Tab by mouth every Monday, Wednesday, Friday. 13 Tab 5    fluticasone (FLONASE) 50 mcg/actuation nasal spray 2 Sprays by Both Nostrils route daily.  ketorolac (ACULAR) 0.5 % ophthalmic solution PLACE 1 GTT INTO OS D  6    ALPRAZolam (XANAX) 1 mg tablet Take 1 Tab by mouth nightly as needed for Anxiety for up to 30 doses.  (Patient taking differently: Take 0.5 Tabs by mouth nightly as needed for Anxiety.) 30 Tab 0    meclizine (ANTIVERT) 25 mg tablet Take 1 Tab by mouth three (3) times daily as needed. Chewable tabs 90 Tab 1    Cholecalciferol, Vitamin D3, 50,000 unit cap Take 1 Cap by mouth every fourteen (14) days. Indications: VITAMIN D DEFICIENCY 6 Cap 4    cyanocobalamin (VITAMIN B-12) 1,000 mcg sublingual tablet Take 1 Tab by mouth daily. 90 Tab 3    atorvastatin (LIPITOR) 10 mg tablet Take 1 Tab by mouth daily. (Patient taking differently: Take 1 Tab by mouth daily. MW) 90 Tab 0    traMADol (ULTRAM) 50 mg tablet Take 1 Tab by mouth every eight (8) hours as needed for Pain for up to 20 doses. Max Daily Amount: 150 mg. 20 Tab 0    furosemide (LASIX) 20 mg tablet 3 times a week. 30 Tab 2    albuterol (PROVENTIL VENTOLIN) 2.5 mg /3 mL (0.083 %) nebulizer solution 1.5 mL by Nebulization route every four (4) hours as needed for Wheezing. 24 Each 1    acetaminophen (TYLENOL) 325 mg tablet Take  by mouth every four (4) hours as needed for Pain.  saccharomyces boulardii (FLORASTOR) 250 mg capsule Take 250 mg by mouth daily.  aspirin delayed-release 81 mg tablet Take 81 mg by mouth daily.  amLODIPine (NORVASC) 2.5 mg tablet Take 1 Tab by mouth daily for 90 days. 30 Tab 2    omeprazole (PRILOSEC) 20 mg capsule Take 1 Cap by mouth two (2) times a day. 60 Cap 3    mometasone (NASONEX) 50 mcg/actuation nasal spray USE 2 SPRAYS IN BOTH NOSTRILS DAILY AS NEEDED (Patient not taking: No sig reported) 1 Container 2    iron gly,fum-C-F25-nx-hlmmbqhd (MAXARON FORTE) 150 mg iron-200 mg-250 mcg tab As directed. (Patient taking differently: weekly) 90 Tab 2     No current facility-administered medications on file prior to visit.         Allergies   Allergen Reactions    Influenza Tri-Split 08-09 Vac Other (comments)     High fever 104degrees    Codeine Shortness of Breath    Darvon [Propoxyphene] Shortness of Breath    Morphine Palpitations    Pcn [Penicillins] Hives    Percocet [Oxycodone-Acetaminophen] Unknown (comments)     Says unsure was a long time ago       Past Medical History:   Diagnosis Date    Acute on chronic diastolic heart failure ([facility] 3/27/2012    Arthritis     Calculus of kidney     Cancer ([facility]     bladder    Cataract     in  and implant inserted left eye    Cataract     left eye    Chronic pain     GERD (gastroesophageal reflux disease)     Heart attack ([facility]     in     Hypercholesterolemia     Thromboembolus [facility])        Past Surgical History:   Procedure Laterality Date    BREAST SURGERY PROCEDURE UNLISTED      CARDIAC SURG PROCEDURE UNLIST      stent inserted 2002    HX CHOLECYSTECTOMY      HX GYN       x 4    HX HEENT      HX ORTHOPAEDIC      HX UROLOGICAL      found small tumor    HX WRIST FRACTURE [place]  Oct 2014       Family History   Problem Relation Age of Onset    Heart Disease Mother     Hypertension Mother     Heart Disease Brother     Cancer Brother     Other Brother      ortho    Heart Disease Maternal Aunt     Arthritis-rheumatoid Maternal Aunt     Heart Disease Maternal Uncle     Arthritis-rheumatoid Maternal Uncle     Heart Disease Maternal Grandmother     Stroke Maternal Grandmother     Kidney Disease Other        Social History     Social History    Marital status:      Spouse name: N/A    Number of children: N/A    Years of education: N/A     Occupational History    Not on file. Social History Main Topics    Smoking status: Former Smoker    Smokeless tobacco: Never Used      Comment: quit     Alcohol use No    Drug use: No    Sexual activity: Not Currently      Comment:      Other Topics Concern    Not on file     Social History Narrative           Review of Systems   Constitutional: Negative for malaise/fatigue. HENT: Negative for congestion. Respiratory: Negative for cough and shortness of breath. Cardiovascular: Negative for chest pain and palpitations. Genitourinary: Positive for frequency. Negative for urgency. Musculoskeletal: Negative for joint pain and myalgias. Neurological: Positive for dizziness. Negative for tingling, sensory change, weakness and headaches. Psychiatric/Behavioral: Negative for depression, memory loss and substance abuse. The patient is nervous/anxious. Visit Vitals    /56 (BP 1 Location: Right arm, BP Patient Position: Sitting)    Pulse (!) 56    Temp 97.6 °F (36.4 °C) (Oral)    Resp 16    Ht 5' 2\" (1.575 m)    Wt 158 lb 12.8 oz (72 kg)    SpO2 95%    BMI 29.04 kg/m2       Physical Exam   Constitutional: She is oriented to person, place, and time. She appears well-developed and well-nourished. No distress. HENT:   Right Ear: External ear normal.   Left Ear: External ear normal.   Eyes: Conjunctivae and EOM are normal. Right eye exhibits no discharge. Left eye exhibits no discharge. Neck: Normal range of motion. Neck supple. Cardiovascular: Normal rate and regular rhythm. Pulmonary/Chest: Effort normal and breath sounds normal. She has no wheezes. Abdominal: Soft. Bowel sounds are normal. There is no tenderness. Lymphadenopathy:     She has no cervical adenopathy. Neurological: She is alert and oriented to person, place, and time. Skin: She is not diaphoretic. Psychiatric: She has a normal mood and affect. Her behavior is normal.   Nursing note and vitals reviewed. ASSESSMENT and PLAN    ICD-10-CM ICD-9-CM    1. Essential hypertension I10 401.9 BP is well controlled with current meds. If her creatinine levels stay stable we will continue on the same dose. 2. Anxiety F41.9 300.00 ALPRAZolam (XANAX) 1 mg tablet script given to patient    She should take 1/2 pill every other night as she has been less anxious lately. 3. Elevated serum creatinine I20.56 641.21 METABOLIC PANEL, BASIC    Will recheck kidney function today. 4. Chronic fatigue R53.82 780.79 CBC W/O DIFF    Will recheck hemoglobin today.         This plan was reviewed with the patient and patient agrees. All questions were answered. This scribe documentation was reviewed by me and accurately reflects the examination and decisions made by me. This note will not be viewable in [address]

## 2017-08-28 NOTE — MR AVS SNAPSHOT
Visit Information Date & Time Provider Department Dept. Phone Encounter #  
 8/28/2017 12:00 PM Merryl Bence, [address] Internal Medicine [phone] [id number] Your Appointments 8/15/2018  2:00 PM  
Medicare Physical with Merryl Bence, MD  
[facility] Internal Medicine [address]) Appt Note: [facility] - [address] [facility] [address] [facility] [address]  
[address] [facility] [address] [facility] [address] Upcoming Health Maintenance Date Due  
 GLAUCOMA SCREENING Q2Y 8/13/1989 INFLUENZA AGE 9 TO ADULT 8/1/2017 MEDICARE YEARLY EXAM 6/29/2018 DTaP/Tdap/Td series (2 - Td) 6/14/2026 Allergies as of 8/28/2017  Review Complete On: 8/28/2017 By: Nola Aleman LPN Severity Noted Reaction Type Reactions Influenza Tri-split 08-09 Vac High 03/27/2012   Systemic Other (comments) High fever 104degrees Codeine  04/14/2010    Shortness of Breath Darvon [Propoxyphene]  04/14/2010    Shortness of Breath Morphine  04/14/2010    Palpitations Pcn [Penicillins]  04/14/2010    Hives Percocet [Oxycodone-acetaminophen]  05/04/2015    Unknown (comments) Says unsure was a long time ago Current Immunizations  Reviewed on 9/12/2014 No immunizations on file. Not reviewed this visit You Were Diagnosed With   
  
 Codes Comments Essential hypertension    -  Primary ICD-10-CM: I10 
ICD-9-CM: 401.9 Anxiety     ICD-10-CM: F41.9 ICD-9-CM: 300.00 Elevated serum creatinine     ICD-10-CM: R79.89 ICD-9-CM: 790.99 Chronic fatigue     ICD-10-CM: R53.82 
ICD-9-CM: 780.79 Vitals BP Pulse Temp Resp Height(growth percentile) Weight(growth percentile) 118/56 (BP 1 Location: Right arm, BP Patient Position: Sitting) (!) 56 97.6 °F (36.4 °C) (Oral) 16 5' 2\" (1.575 m) 158 lb 12.8 oz (72 kg) SpO2 BMI OB Status Smoking Status 95% 29.04 kg/m2 Postmenopausal Former Smoker Vitals History BMI and BSA Data Body Mass Index Body Surface Area 29.04 kg/m 2 1.77 m 2 Preferred Pharmacy Pharmacy Name Phone [address], [address] Your Updated Medication List  
  
   
This list is accurate as of: 8/28/17 12:41 PM.  Always use your most recent med list.  
  
  
  
  
 albuterol 2.5 mg /3 mL (0.083 %) nebulizer solution Commonly known as:  PROVENTIL VENTOLIN  
1.5 mL by Nebulization route every four (4) hours as needed for Wheezing. * ALPRAZolam 1 mg tablet Commonly known as:  Will Humberto Take 1 Tab by mouth nightly as needed for Anxiety for up to 30 doses. * ALPRAZolam 1 mg tablet Commonly known as:  Will Humberto Take 1 Tab by mouth nightly as needed for Anxiety for up to 20 doses. Max Daily Amount: 1 mg. amLODIPine 2.5 mg tablet Commonly known as:  Hamzah Aprashard Take 1 Tab by mouth daily for 90 days. aspirin delayed-release 81 mg tablet Take 81 mg by mouth daily. atorvastatin 10 mg tablet Commonly known as:  LIPITOR Take 1 Tab by mouth daily. chlorthalidone 25 mg tablet Commonly known as:  Butts [place] Take 12 mg by mouth daily. Pt instructed to take half of the 25 mg pill. Cholecalciferol (Vitamin D3) 50,000 unit Cap Take 1 Cap by mouth every fourteen (14) days. Indications: VITAMIN D DEFICIENCY  
  
 cyanocobalamin 1,000 mcg sublingual tablet Commonly known as:  VITAMIN B-12 Take 1 Tab by mouth daily. FLONASE 50 mcg/actuation nasal spray Generic drug:  fluticasone 2 Sprays by Both Nostrils route daily. FLORASTOR 250 mg capsule Generic drug:  Saccharomyces boulardii Take 250 mg by mouth daily. furosemide 20 mg tablet Commonly known as:  LASIX  
3 times a week. iron gly,fum-C-K20-ns-ujugyzjs 150 mg iron-200 mg-250 mcg Tab Commonly known as:  [place] As directed. ketorolac 0.5 % ophthalmic solution Commonly known as:  ACULAR  
PLACE 1 GTT INTO OS D  
  
 LYRICA 25 mg capsule Generic drug:  pregabalin Take 25 mg by mouth every Monday, Wednesday, Friday. meclizine 25 mg tablet Commonly known as:  ANTIVERT Take 1 Tab by mouth three (3) times daily as needed. Chewable tabs  
  
 mometasone 50 mcg/actuation nasal spray Commonly known as:  NASONEX  
USE 2 SPRAYS IN BOTH NOSTRILS DAILY AS NEEDED  
  
 omeprazole 20 mg capsule Commonly known as:  PRILOSEC Take 1 Cap by mouth two (2) times a day. potassium chloride SR 10 mEq tablet Commonly known as:  KLOR-CON 10 Take 1 Tab by mouth every Monday, Wednesday, Friday. traMADol 50 mg tablet Commonly known as:  ULTRAM  
Take 1 Tab by mouth every eight (8) hours as needed for Pain for up to 20 doses. Max Daily Amount: 150 mg.  
  
 TYLENOL 325 mg tablet Generic drug:  acetaminophen Take  by mouth every four (4) hours as needed for Pain. * Notice: This list has 2 medication(s) that are the same as other medications prescribed for you. Read the directions carefully, and ask your doctor or other care provider to review them with you. Prescriptions Printed Refills ALPRAZolam (XANAX) 1 mg tablet 0 Sig: Take 1 Tab by mouth nightly as needed for Anxiety for up to 20 doses. Max Daily Amount: 1 mg. Class: Print Route: Oral  
  
We Performed the Following CBC W/O DIFF [91881 CPT(R)] METABOLIC PANEL, BASIC [48699 CPT(R)] Introducing [facility] & HEALTH SERVICES! Dear Gildardo Loss: Thank you for requesting a WaveDeck account. Our records indicate that you already have an active WaveDeck account. You can access your account anytime at [email or web address] Did you know that you can access your hospital and ER discharge instructions at any time in WaveDeck?   You can also review all of your test results from your hospital stay or ER visit. Additional Information If you have questions, please visit the Frequently Asked Questions section of the Pay-Me website at [email or web address] Remember, Pay-Me is NOT to be used for urgent needs. For medical emergencies, dial 911. Now available from your iPhone and Android! Please provide this summary of care documentation to your next provider. Your primary care clinician is listed as Librado Cody. If you have any questions after today's visit, please call [phone].

## 2017-08-28 NOTE — PROGRESS NOTES
Chief Complaint   Patient presents with    Fatigue     patient c/o fatigue    Hypertension     follow up     1. Have you been to the ER, urgent care clinic since your last visit? Hospitalized since your last visit? No    2. Have you seen or consulted any other health care providers outside of the [address] [place] [address] since your last visit? Include any pap smears or colon screening.  No

## 2017-08-29 LAB
BUN SERPL-MCNC: 23 MG/DL (ref 10–36)
BUN/CREAT SERPL: 21 (ref 12–28)
CALCIUM SERPL-MCNC: 8.7 MG/DL (ref 8.7–10.3)
CHLORIDE SERPL-SCNC: 101 MMOL/L (ref 96–106)
CO2 SERPL-SCNC: 24 MMOL/L (ref 18–29)
CREAT SERPL-MCNC: 1.09 MG/DL (ref 0.57–1)
ERYTHROCYTE [DISTWIDTH] IN BLOOD BY AUTOMATED COUNT: 13.9 % (ref 12.3–15.4)
GLUCOSE SERPL-MCNC: 95 MG/DL (ref 65–99)
HCT VFR BLD AUTO: 34.3 % (ref 34–46.6)
HGB BLD-MCNC: 11.4 G/DL (ref 11.1–15.9)
INTERPRETATION: NORMAL
MCH RBC QN AUTO: 31.2 PG (ref 26.6–33)
MCHC RBC AUTO-ENTMCNC: 33.2 G/DL (ref 31.5–35.7)
MCV RBC AUTO: 94 FL (ref 79–97)
PLATELET # BLD AUTO: 180 X10E3/UL (ref 150–379)
POTASSIUM SERPL-SCNC: 4 MMOL/L (ref 3.5–5.2)
RBC # BLD AUTO: 3.65 X10E6/UL (ref 3.77–5.28)
SODIUM SERPL-SCNC: 142 MMOL/L (ref 134–144)
WBC # BLD AUTO: 10.7 X10E3/UL (ref 3.4–10.8)

## 2017-09-20 RX ORDER — CHLORTHALIDONE 25 MG/1
12 TABLET ORAL DAILY
Qty: 45 TAB | Refills: 0 | Status: SHIPPED | OUTPATIENT
Start: 2017-09-20 | End: 2017-12-13 | Stop reason: SDUPTHER

## 2017-10-06 ENCOUNTER — DOCUMENTATION ONLY (OUTPATIENT)
Dept: INTERNAL MEDICINE CLINIC | Age: 82
End: 2017-10-06

## 2017-10-06 DIAGNOSIS — F41.9 ANXIETY: ICD-10-CM

## 2017-10-06 RX ORDER — ALPRAZOLAM 1 MG/1
1 TABLET ORAL
Qty: 20 TAB | Refills: 0 | Status: SHIPPED | OUTPATIENT
Start: 2017-10-06 | End: 2017-11-13 | Stop reason: DRUGHIGH

## 2017-10-21 DIAGNOSIS — E78.2 MIXED HYPERLIPIDEMIA: Primary | ICD-10-CM

## 2017-10-21 RX ORDER — ATORVASTATIN CALCIUM 10 MG/1
10 TABLET, FILM COATED ORAL
Qty: 90 TAB | Refills: 0 | Status: SHIPPED | OUTPATIENT
Start: 2017-10-22 | End: 2018-04-16 | Stop reason: SDUPTHER

## 2017-11-13 DIAGNOSIS — F41.8 OTHER SPECIFIED ANXIETY DISORDERS: Primary | ICD-10-CM

## 2017-11-13 DIAGNOSIS — F41.9 ANXIETY: ICD-10-CM

## 2017-11-13 RX ORDER — ALPRAZOLAM 1 MG/1
1 TABLET ORAL
Qty: 20 TAB | Refills: 0 | Status: CANCELLED | OUTPATIENT
Start: 2017-11-13

## 2017-11-13 RX ORDER — ALPRAZOLAM 0.5 MG/1
0.5 TABLET ORAL
Qty: 20 TAB | Refills: 0 | Status: SHIPPED | OUTPATIENT
Start: 2017-11-13 | End: 2018-01-01 | Stop reason: SDUPTHER

## 2017-11-14 ENCOUNTER — DOCUMENTATION ONLY (OUTPATIENT)
Dept: INTERNAL MEDICINE CLINIC | Age: 82
End: 2017-11-14

## 2017-11-14 NOTE — TELEPHONE ENCOUNTER
Tell her daughter I cut the dose from 1 to 0.5 mg because she has been taking more frequently lately.

## 2017-11-14 NOTE — PROGRESS NOTES
Called and spoke with patient and confirmed patient identity x2 let her know that her script is now ready for pickup and that the dose was lowered. She voiced understanding and stated that her daughter will be by to get her script.

## 2017-12-13 RX ORDER — CHLORTHALIDONE 25 MG/1
12 TABLET ORAL DAILY
Qty: 90 TAB | Refills: 0 | Status: SHIPPED | OUTPATIENT
Start: 2017-12-13 | End: 2018-01-29 | Stop reason: SDUPTHER

## 2018-01-01 ENCOUNTER — DOCUMENTATION ONLY (OUTPATIENT)
Dept: PRIMARY CARE CLINIC | Age: 83
End: 2018-01-01

## 2018-01-01 ENCOUNTER — TELEPHONE (OUTPATIENT)
Dept: PRIMARY CARE CLINIC | Age: 83
End: 2018-01-01

## 2018-01-01 ENCOUNTER — OFFICE VISIT (OUTPATIENT)
Dept: PRIMARY CARE CLINIC | Age: 83
End: 2018-01-01

## 2018-01-01 ENCOUNTER — DOCUMENTATION ONLY (OUTPATIENT)
Dept: INTERNAL MEDICINE CLINIC | Age: 83
End: 2018-01-01

## 2018-01-01 ENCOUNTER — PATIENT OUTREACH (OUTPATIENT)
Dept: PRIMARY CARE CLINIC | Age: 83
End: 2018-01-01

## 2018-01-01 ENCOUNTER — PATIENT OUTREACH (OUTPATIENT)
Dept: INTERNAL MEDICINE CLINIC | Age: 83
End: 2018-01-01

## 2018-01-01 ENCOUNTER — TELEPHONE (OUTPATIENT)
Dept: INTERNAL MEDICINE CLINIC | Age: 83
End: 2018-01-01

## 2018-01-01 VITALS
WEIGHT: 148 LBS | TEMPERATURE: 97.7 F | DIASTOLIC BLOOD PRESSURE: 60 MMHG | HEIGHT: 62 IN | OXYGEN SATURATION: 98 % | HEART RATE: 72 BPM | SYSTOLIC BLOOD PRESSURE: 136 MMHG | RESPIRATION RATE: 18 BRPM | BODY MASS INDEX: 27.23 KG/M2

## 2018-01-01 DIAGNOSIS — E87.6 HYPOKALEMIA: Primary | ICD-10-CM

## 2018-01-01 DIAGNOSIS — R06.2 WHEEZING: ICD-10-CM

## 2018-01-01 DIAGNOSIS — M79.7 FIBROMYALGIA: Primary | ICD-10-CM

## 2018-01-01 DIAGNOSIS — R06.2 WHEEZING: Primary | ICD-10-CM

## 2018-01-01 DIAGNOSIS — K29.00 OTHER ACUTE GASTRITIS WITHOUT HEMORRHAGE: ICD-10-CM

## 2018-01-01 DIAGNOSIS — Z74.09 IMPAIRED MOBILITY AND ENDURANCE: ICD-10-CM

## 2018-01-01 DIAGNOSIS — F41.8 OTHER SPECIFIED ANXIETY DISORDERS: ICD-10-CM

## 2018-01-01 DIAGNOSIS — E78.2 MIXED HYPERLIPIDEMIA: ICD-10-CM

## 2018-01-01 DIAGNOSIS — K44.9 HIATAL HERNIA: ICD-10-CM

## 2018-01-01 DIAGNOSIS — M17.0 PRIMARY OSTEOARTHRITIS OF BOTH KNEES: ICD-10-CM

## 2018-01-01 DIAGNOSIS — R12 HEART BURN: ICD-10-CM

## 2018-01-01 DIAGNOSIS — R06.09 DYSPNEA ON EXERTION: ICD-10-CM

## 2018-01-01 DIAGNOSIS — F41.9 ANXIETY: ICD-10-CM

## 2018-01-01 DIAGNOSIS — R05.3 COUGH, PERSISTENT: ICD-10-CM

## 2018-01-01 DIAGNOSIS — E53.8 B12 DEFICIENCY: ICD-10-CM

## 2018-01-01 DIAGNOSIS — D50.9 IRON DEFICIENCY ANEMIA, UNSPECIFIED IRON DEFICIENCY ANEMIA TYPE: Primary | ICD-10-CM

## 2018-01-01 DIAGNOSIS — R12 HEART BURN: Primary | ICD-10-CM

## 2018-01-01 DIAGNOSIS — J90 PLEURAL EFFUSION, BILATERAL: ICD-10-CM

## 2018-01-01 DIAGNOSIS — R11.0 NAUSEA: Primary | ICD-10-CM

## 2018-01-01 DIAGNOSIS — Z00.00 MEDICARE ANNUAL WELLNESS VISIT, SUBSEQUENT: Primary | ICD-10-CM

## 2018-01-01 DIAGNOSIS — E55.9 VITAMIN D DEFICIENCY: ICD-10-CM

## 2018-01-01 DIAGNOSIS — D50.9 IRON DEFICIENCY ANEMIA, UNSPECIFIED IRON DEFICIENCY ANEMIA TYPE: ICD-10-CM

## 2018-01-01 DIAGNOSIS — D64.9 ANEMIA, UNSPECIFIED TYPE: Primary | ICD-10-CM

## 2018-01-01 DIAGNOSIS — I10 ESSENTIAL HYPERTENSION: ICD-10-CM

## 2018-01-01 DIAGNOSIS — F41.9 ANXIETY: Primary | ICD-10-CM

## 2018-01-01 LAB
ALBUMIN SERPL-MCNC: 4 G/DL (ref 3.2–4.6)
ALBUMIN/GLOB SERPL: 1.9 {RATIO} (ref 1.2–2.2)
ALP SERPL-CCNC: 87 IU/L (ref 39–117)
ALT SERPL-CCNC: 4 IU/L (ref 0–32)
AST SERPL-CCNC: 14 IU/L (ref 0–40)
BILIRUB SERPL-MCNC: 0.4 MG/DL (ref 0–1.2)
BUN SERPL-MCNC: 19 MG/DL (ref 10–36)
BUN/CREAT SERPL: 17 (ref 12–28)
CALCIUM SERPL-MCNC: 9.3 MG/DL (ref 8.7–10.3)
CHLORIDE SERPL-SCNC: 98 MMOL/L (ref 96–106)
CO2 SERPL-SCNC: 25 MMOL/L (ref 20–29)
CREAT SERPL-MCNC: 1.11 MG/DL (ref 0.57–1)
ERYTHROCYTE [DISTWIDTH] IN BLOOD BY AUTOMATED COUNT: 15.2 % (ref 12.3–15.4)
GLOBULIN SER CALC-MCNC: 2.1 G/DL (ref 1.5–4.5)
GLUCOSE SERPL-MCNC: 102 MG/DL (ref 65–99)
HCT VFR BLD AUTO: 29.9 % (ref 34–46.6)
HGB BLD-MCNC: 9.4 G/DL (ref 11.1–15.9)
MAGNESIUM SERPL-MCNC: 1.8 MG/DL (ref 1.6–2.3)
MCH RBC QN AUTO: 26.6 PG (ref 26.6–33)
MCHC RBC AUTO-ENTMCNC: 31.4 G/DL (ref 31.5–35.7)
MCV RBC AUTO: 85 FL (ref 79–97)
PLATELET # BLD AUTO: 213 X10E3/UL (ref 150–379)
POTASSIUM SERPL-SCNC: 3.3 MMOL/L (ref 3.5–5.2)
PROT SERPL-MCNC: 6.1 G/DL (ref 6–8.5)
RBC # BLD AUTO: 3.54 X10E6/UL (ref 3.77–5.28)
SODIUM SERPL-SCNC: 139 MMOL/L (ref 134–144)
SPECIMEN STATUS REPORT, ROLRST: NORMAL
WBC # BLD AUTO: 8.2 X10E3/UL (ref 3.4–10.8)

## 2018-01-01 RX ORDER — ALBUTEROL SULFATE 0.83 MG/ML
1.25 SOLUTION RESPIRATORY (INHALATION)
Qty: 24 EACH | Refills: 1 | Status: SHIPPED | OUTPATIENT
Start: 2018-01-01 | End: 2018-01-01 | Stop reason: SDUPTHER

## 2018-01-01 RX ORDER — OMEPRAZOLE 40 MG/1
40 CAPSULE, DELAYED RELEASE ORAL DAILY
Qty: 30 CAP | Refills: 0 | Status: SHIPPED | OUTPATIENT
Start: 2018-01-01 | End: 2018-01-01 | Stop reason: SDUPTHER

## 2018-01-01 RX ORDER — ALPRAZOLAM 0.5 MG/1
0.5 TABLET ORAL
Qty: 20 TAB | Refills: 0 | Status: SHIPPED | OUTPATIENT
Start: 2018-01-01 | End: 2018-01-01 | Stop reason: SDUPTHER

## 2018-01-01 RX ORDER — MAGNESIUM 200 MG
1000 TABLET ORAL DAILY
Qty: 90 TAB | Refills: 3 | Status: SHIPPED | OUTPATIENT
Start: 2018-01-01

## 2018-01-01 RX ORDER — ALBUTEROL SULFATE 0.83 MG/ML
1.25 SOLUTION RESPIRATORY (INHALATION)
Qty: 24 EACH | Refills: 1 | Status: SHIPPED | OUTPATIENT
Start: 2018-01-01

## 2018-01-01 RX ORDER — SUCRALFATE 1 G/10ML
1 SUSPENSION ORAL 4 TIMES DAILY
Qty: 600 ML | Refills: 0 | Status: SHIPPED | OUTPATIENT
Start: 2018-01-01 | End: 2018-01-01 | Stop reason: SDUPTHER

## 2018-01-01 RX ORDER — ATORVASTATIN CALCIUM 10 MG/1
10 TABLET, FILM COATED ORAL
Qty: 90 TAB | Refills: 0 | Status: SHIPPED | OUTPATIENT
Start: 2018-01-01 | End: 2019-01-01

## 2018-01-01 RX ORDER — ALPRAZOLAM 0.5 MG/1
0.5 TABLET ORAL
Qty: 20 TAB | Refills: 0 | Status: SHIPPED | OUTPATIENT
Start: 2018-01-01 | End: 2019-01-01

## 2018-01-01 RX ORDER — PREGABALIN 25 MG/1
25 CAPSULE ORAL 2 TIMES DAILY
Qty: 60 CAP | Refills: 2 | Status: SHIPPED | OUTPATIENT
Start: 2018-01-01 | End: 2018-01-01

## 2018-01-01 RX ORDER — SUCRALFATE 1 G/10ML
SUSPENSION ORAL
Qty: 600 ML | Refills: 0 | Status: SHIPPED | OUTPATIENT
Start: 2018-01-01

## 2018-01-01 RX ORDER — CHLORTHALIDONE 25 MG/1
12 TABLET ORAL DAILY
Qty: 90 TAB | Refills: 0 | Status: SHIPPED | OUTPATIENT
Start: 2018-01-01 | End: 2018-01-01

## 2018-01-01 RX ORDER — ALPRAZOLAM 0.5 MG/1
0.5 TABLET ORAL
Qty: 20 TAB | Refills: 0 | Status: SHIPPED | OUTPATIENT
Start: 2018-01-01 | End: 2018-01-01 | Stop reason: ALTCHOICE

## 2018-01-01 RX ORDER — OMEPRAZOLE 40 MG/1
40 CAPSULE, DELAYED RELEASE ORAL DAILY
Qty: 30 CAP | Refills: 2 | Status: SHIPPED | OUTPATIENT
Start: 2018-01-01 | End: 2019-01-01

## 2018-01-01 RX ORDER — FERROUS SULFATE 325(65) MG
325 TABLET, DELAYED RELEASE (ENTERIC COATED) ORAL
Qty: 90 TAB | Refills: 0 | Status: SHIPPED | OUTPATIENT
Start: 2018-01-01 | End: 2018-01-01 | Stop reason: ALTCHOICE

## 2018-01-01 RX ORDER — BENZONATATE 200 MG/1
200 CAPSULE ORAL
Qty: 21 CAP | Refills: 0 | Status: SHIPPED | OUTPATIENT
Start: 2018-01-01 | End: 2018-01-01

## 2018-01-01 RX ORDER — SUCRALFATE 1 G/10ML
1 SUSPENSION ORAL 4 TIMES DAILY
Qty: 100 ML | Refills: 0 | Status: SHIPPED | OUTPATIENT
Start: 2018-01-01 | End: 2018-01-01 | Stop reason: SDUPTHER

## 2018-01-01 RX ORDER — OMEPRAZOLE 40 MG/1
40 CAPSULE, DELAYED RELEASE ORAL DAILY
Status: CANCELLED | OUTPATIENT
Start: 2018-01-01

## 2018-01-01 RX ORDER — ASPIRIN 325 MG
50000 TABLET, DELAYED RELEASE (ENTERIC COATED) ORAL
Qty: 6 CAP | Refills: 4 | Status: SHIPPED | OUTPATIENT
Start: 2018-01-01

## 2018-01-01 RX ORDER — POTASSIUM CHLORIDE 750 MG/1
10 TABLET, EXTENDED RELEASE ORAL DAILY
Qty: 30 TAB | Refills: 2 | Status: SHIPPED | OUTPATIENT
Start: 2018-01-01 | End: 2019-01-01

## 2018-01-01 RX ORDER — OMEPRAZOLE 40 MG/1
40 CAPSULE, DELAYED RELEASE ORAL DAILY
COMMUNITY
End: 2018-01-01 | Stop reason: SDUPTHER

## 2018-01-01 RX ORDER — OMEPRAZOLE 40 MG/1
40 CAPSULE, DELAYED RELEASE ORAL DAILY
Qty: 30 CAP | Refills: 3 | Status: SHIPPED | OUTPATIENT
Start: 2018-01-01 | End: 2018-01-01

## 2018-01-01 RX ORDER — POTASSIUM CHLORIDE 750 MG/1
10 TABLET, FILM COATED, EXTENDED RELEASE ORAL
Qty: 13 TAB | Refills: 5 | Status: SHIPPED | OUTPATIENT
Start: 2018-01-01

## 2018-01-01 RX ORDER — ALBUTEROL SULFATE 90 UG/1
AEROSOL, METERED RESPIRATORY (INHALATION)
Qty: 1 INHALER | Refills: 2 | Status: SHIPPED | OUTPATIENT
Start: 2018-01-01 | End: 2018-01-01

## 2018-01-01 RX ORDER — SUCRALFATE 1 G/10ML
1 SUSPENSION ORAL 4 TIMES DAILY
Qty: 100 ML | Refills: 0 | Status: SHIPPED | OUTPATIENT
Start: 2018-01-01

## 2018-01-02 ENCOUNTER — DOCUMENTATION ONLY (OUTPATIENT)
Dept: INTERNAL MEDICINE CLINIC | Age: 83
End: 2018-01-02

## 2018-01-02 RX ORDER — ALPRAZOLAM 0.5 MG/1
0.5 TABLET ORAL
Qty: 20 TAB | Refills: 0 | Status: SHIPPED | OUTPATIENT
Start: 2018-01-02 | End: 2018-02-20 | Stop reason: SDUPTHER

## 2018-01-02 NOTE — TELEPHONE ENCOUNTER
From: Sangeetha Crystal  To: Swapnil Allen MD  Sent: 1/1/2018 5:35 PM EST  Subject: Medication Renewal Request    Original authorizing provider: MD Jose Teixeira.  Willi Caldwell would like a refill of the following medications:  ALPRAZolam Bevely Shan) 0.5 mg tablet Swapnil Allen MD]    Preferred pharmacy: [address] [place] [address] [facility]    Comment:

## 2018-01-11 RX ORDER — OMEPRAZOLE 20 MG/1
20 CAPSULE, DELAYED RELEASE ORAL 2 TIMES DAILY
Qty: 180 CAP | Refills: 0 | Status: SHIPPED | OUTPATIENT
Start: 2018-01-11 | End: 2018-04-11 | Stop reason: DRUGHIGH

## 2018-01-26 DIAGNOSIS — J11.1 INFLUENZA: Primary | ICD-10-CM

## 2018-01-26 RX ORDER — OSELTAMIVIR PHOSPHATE 75 MG/1
75 CAPSULE ORAL 2 TIMES DAILY
Qty: 10 CAP | Refills: 0 | Status: SHIPPED | OUTPATIENT
Start: 2018-01-26 | End: 2018-01-31

## 2018-01-26 NOTE — TELEPHONE ENCOUNTER
Patient is requesting tamiflu to be sent into pharmacy. States no flu in house except mother did not get the flu shot and wants in case.

## 2018-01-29 RX ORDER — CHLORTHALIDONE 25 MG/1
12 TABLET ORAL DAILY
Qty: 90 TAB | Refills: 0 | Status: SHIPPED | OUTPATIENT
Start: 2018-01-29 | End: 2018-01-01 | Stop reason: SDUPTHER

## 2018-01-29 NOTE — TELEPHONE ENCOUNTER
Spoke with patients daughter who states that the patient was supposed to be taking 1/2 tablet of the chlorthalidone but has been taking 1 tablet. She is out of the medication and daughter states that she has been doing good and has had no swelling since taking the 1 tablet. She would like to know if she should continue taking 1 tablet or the 1/2 and states that these pills are very tiny to cut, and also need a refill on this as well.  Thank you    Last refill 12/13/17  Last office visit 8/28/17

## 2018-02-02 RX ORDER — POTASSIUM CHLORIDE 750 MG/1
10 TABLET, FILM COATED, EXTENDED RELEASE ORAL
Qty: 13 TAB | Refills: 5 | Status: SHIPPED | OUTPATIENT
Start: 2018-02-02 | End: 2018-01-01 | Stop reason: SDUPTHER

## 2018-02-19 NOTE — TELEPHONE ENCOUNTER
Patient request for xanax refill  LOV  8/28/2017  Last refill sent yesterday to [facility] and Brent Bravo

## 2018-02-20 DIAGNOSIS — F41.8 OTHER SPECIFIED ANXIETY DISORDERS: ICD-10-CM

## 2018-02-21 RX ORDER — ALPRAZOLAM 0.5 MG/1
0.5 TABLET ORAL
Qty: 20 TAB | Refills: 0 | Status: SHIPPED | OUTPATIENT
Start: 2018-02-21 | End: 2018-03-28 | Stop reason: SDUPTHER

## 2018-02-21 NOTE — TELEPHONE ENCOUNTER
From: Jaden Like  To: Chloe Islas MD  Sent: 2/20/2018 10:23 PM EST  Subject: Medication Renewal Request    Original authorizing provider: MD Jesse Mchugh. Rachel Rene would like a refill of the following medications:  ALPRAZolam [place]) 0.5 mg tablet Chloe Islas MD]    Preferred pharmacy: [address] [place] [address] [facility]    Comment:   This is the second request. Thank you

## 2018-02-22 ENCOUNTER — DOCUMENTATION ONLY (OUTPATIENT)
Dept: INTERNAL MEDICINE CLINIC | Age: 83
End: 2018-02-22

## 2018-03-28 DIAGNOSIS — F41.8 OTHER SPECIFIED ANXIETY DISORDERS: ICD-10-CM

## 2018-03-30 RX ORDER — ALPRAZOLAM 0.5 MG/1
0.5 TABLET ORAL
Qty: 20 TAB | Refills: 0 | Status: SHIPPED | OUTPATIENT
Start: 2018-03-30 | End: 2018-01-01 | Stop reason: SDUPTHER

## 2018-04-03 ENCOUNTER — PATIENT OUTREACH (OUTPATIENT)
Dept: INTERNAL MEDICINE CLINIC | Age: 83
End: 2018-04-03

## 2018-04-03 RX ORDER — CEPHALEXIN 500 MG/1
500 CAPSULE ORAL 4 TIMES DAILY
COMMUNITY
End: 2018-04-11 | Stop reason: ALTCHOICE

## 2018-04-03 RX ORDER — SUCRALFATE 1 G/10ML
SUSPENSION ORAL 4 TIMES DAILY
COMMUNITY
End: 2018-01-01 | Stop reason: SDUPTHER

## 2018-04-03 NOTE — PROGRESS NOTES
NNTOCED-     Follow up items for PCP-  - ? Recheck UA - will have just completed ABX at appt   - discuss options for management of hiatal hernia      Seen at [facility] ED on 4/2/18     Chief Complaint- abdominal pain      ED Evaluation- unable to review EMR due to computer system problems. Patients daughter reports CT scan abdomen done showed a large hiatal hernia. Urinalysis showed positive UTI. She will bring all reports from ED to follow up visit with PCP.       Follow up- NN spoke briefly today with patients daughter. Patients daughter reports that patient was positive for UTI. She is taking Keflex. She has scheduled follow up with PCP for 1 week so that she can be re evaluated and UA repeated after ABX completed. The ED recommended GI referral however, at the patients age the daughter isn't sure they would want to pursue any scopes or surgery at this point. She is taking Carafate and we discussed how this medication works and that it should be taken on an empty stomach.      Plan- Follow up with Dr. Julian Saalzar (PCP) on 4/9/18.        Goals      Establish PCP relationships and regularly scheduled appointments. 4/3/18- PCP appt scheduled for 4/9/18. LN       Knowledge and adherence to medication plan. 4/3/18- Discussed with daughter new medications- Keflex and Carafate and how they should be taken.  LN

## 2018-04-11 ENCOUNTER — OFFICE VISIT (OUTPATIENT)
Dept: INTERNAL MEDICINE CLINIC | Age: 83
End: 2018-04-11

## 2018-04-11 VITALS
OXYGEN SATURATION: 98 % | DIASTOLIC BLOOD PRESSURE: 62 MMHG | SYSTOLIC BLOOD PRESSURE: 124 MMHG | HEIGHT: 62 IN | WEIGHT: 153.5 LBS | TEMPERATURE: 98.1 F | HEART RATE: 59 BPM | BODY MASS INDEX: 28.25 KG/M2

## 2018-04-11 DIAGNOSIS — M17.0 PRIMARY OSTEOARTHRITIS OF BOTH KNEES: ICD-10-CM

## 2018-04-11 DIAGNOSIS — M79.89 LEG SWELLING: ICD-10-CM

## 2018-04-11 DIAGNOSIS — R10.9 ABDOMINAL PAIN, UNSPECIFIED ABDOMINAL LOCATION: ICD-10-CM

## 2018-04-11 DIAGNOSIS — I10 ESSENTIAL HYPERTENSION: ICD-10-CM

## 2018-04-11 DIAGNOSIS — K29.00 OTHER ACUTE GASTRITIS WITHOUT HEMORRHAGE: Primary | ICD-10-CM

## 2018-04-11 DIAGNOSIS — K44.9 HIATAL HERNIA: ICD-10-CM

## 2018-04-11 DIAGNOSIS — F51.01 PRIMARY INSOMNIA: ICD-10-CM

## 2018-04-11 LAB
BILIRUB UR QL STRIP: NEGATIVE
GLUCOSE UR-MCNC: NEGATIVE MG/DL
KETONES P FAST UR STRIP-MCNC: NEGATIVE MG/DL
PH UR STRIP: 6 [PH] (ref 4.6–8)
PROT UR QL STRIP: NEGATIVE
SP GR UR STRIP: 1.01 (ref 1–1.03)
UA UROBILINOGEN AMB POC: NORMAL (ref 0.2–1)
URINALYSIS CLARITY POC: CLEAR
URINALYSIS COLOR POC: YELLOW
URINE BLOOD POC: NEGATIVE
URINE LEUKOCYTES POC: NEGATIVE
URINE NITRITES POC: NEGATIVE

## 2018-04-11 RX ORDER — CETIRIZINE HCL 10 MG
TABLET ORAL DAILY
COMMUNITY

## 2018-04-11 RX ORDER — OMEPRAZOLE 40 MG/1
40 CAPSULE, DELAYED RELEASE ORAL DAILY
Qty: 30 CAP | Refills: 0 | Status: SHIPPED | OUTPATIENT
Start: 2018-04-11 | End: 2018-01-01 | Stop reason: SDUPTHER

## 2018-04-11 RX ORDER — PREGABALIN 25 MG/1
25 CAPSULE ORAL
Refills: 2 | OUTPATIENT
Start: 2018-04-11

## 2018-04-11 NOTE — MR AVS SNAPSHOT
[address] [facility] [address] [place] [address] [address] [place] [address] 
[phone] Patient: Shanda Olguin MRN: [id number] Visit Information Date & Time Provider Department Dept. Phone Encounter #  
 2018 10:45 AM Jaye Kelly MD [facility] Internal Medicine [phone] [id number] Your Appointments 8/15/2018  2:00 PM  
Medicare Physical with Jaye Kelly MD  
[facility] Internal Medicine [address]) Appt Note: [facility] - [address] [facility] [address] [facility] [address]  
[address] [facility] [address] [facility] [address] Upcoming Health Maintenance Date Due  
 GLAUCOMA SCREENING Q2Y 1989 Influenza Age 5 to Adult 2017 MEDICARE YEARLY EXAM 2018 DTaP/Tdap/Td series (2 - Td) 2026 Allergies as of 2018  Review Complete On: 2018 By: Eric Carpenter LPN Severity Noted Reaction Type Reactions Influenza Tri-split - Vac High 2012   Systemic Other (comments) High fever 104degrees Codeine  2010    Shortness of Breath Darvon [Propoxyphene]  2010    Shortness of Breath Morphine  2010    Palpitations Pcn [Penicillins]  2010    Hives Percocet [Oxycodone-acetaminophen]  2015    Unknown (comments) Says unsure was a long time ago Current Immunizations  Reviewed on 2014 No immunizations on file. Not reviewed this visit You Were Diagnosed With   
  
 Codes Comments Other acute gastritis without hemorrhage    -  Primary ICD-10-CM: K29.00 ICD-9-CM: 535.00 Hiatal hernia     ICD-10-CM: K44.9 ICD-9-CM: 553.3 Essential hypertension     ICD-10-CM: I10 
ICD-9-CM: 401.9 Primary osteoarthritis of both knees     ICD-10-CM: M17.0 ICD-9-CM: 715.16 Leg swelling     ICD-10-CM: M79.89 ICD-9-CM: 729.81  Primary insomnia     ICD-10-CM: F51.01 
 ICD-9-CM: 307.42 Vitals BP Pulse Temp Height(growth percentile) Weight(growth percentile) SpO2  
 124/62 (BP 1 Location: Left arm, BP Patient Position: Sitting) (!) 59 98.1 °F (36.7 °C) (Oral) 5' 2\" (1.575 m) 153 lb 8 oz (69.6 kg) 98% BMI OB Status Smoking Status 28.08 kg/m2 Postmenopausal Former Smoker Vitals History BMI and BSA Data Body Mass Index Body Surface Area 28.08 kg/m 2 1.74 m 2 Preferred Pharmacy Pharmacy Name Phone [address], [address] Your Updated Medication List  
  
   
This list is accurate as of 4/11/18 11:49 AM.  Always use your most recent med list.  
  
  
  
  
 albuterol 2.5 mg /3 mL (0.083 %) nebulizer solution Commonly known as:  PROVENTIL VENTOLIN  
1.5 mL by Nebulization route every four (4) hours as needed for Wheezing. ALPRAZolam 0.5 mg tablet Commonly known as:  Estefani Belling Take 1 Tab by mouth nightly as needed for Anxiety for up to 20 doses. Max Daily Amount: 0.5 mg.  
  
 aspirin delayed-release 81 mg tablet Take 81 mg by mouth daily. atorvastatin 10 mg tablet Commonly known as:  LIPITOR Take 1 Tab by mouth Every Mon, Wed & Sun for 90 doses. CARAFATE 100 mg/mL suspension Generic drug:  sucralfate Take  by mouth four (4) times daily. cetirizine 10 mg tablet Commonly known as:  ZYRTEC Take  by mouth daily. chlorthalidone 25 mg tablet Commonly known as:  Shearon Dayhoff Take 0.5 Tabs by mouth daily for 90 days. Pt instructed to take half of the 25 mg pill. Cholecalciferol (Vitamin D3) 50,000 unit Cap Take 1 Cap by mouth every fourteen (14) days. Indications: VITAMIN D DEFICIENCY  
  
 cyanocobalamin 1,000 mcg sublingual tablet Commonly known as:  VITAMIN B-12 Take 1 Tab by mouth daily. FLONASE 50 mcg/actuation nasal spray Generic drug:  fluticasone 2 Sprays by Both Nostrils route daily. FLORASTOR 250 mg capsule Generic drug:  Saccharomyces boulardii Take 250 mg by mouth daily. furosemide 20 mg tablet Commonly known as:  LASIX  
3 times a week. iron gly,fum-C-M22-hu-swzepdpu 150 mg iron-200 mg-250 mcg Tab Commonly known as:  Almer Amend As directed. ketorolac 0.5 % ophthalmic solution Commonly known as:  ACULAR  
PLACE 1 GTT INTO OS D  
  
 meclizine 25 mg tablet Commonly known as:  ANTIVERT Take 1 Tab by mouth three (3) times daily as needed. Chewable tabs  
  
 mometasone 50 mcg/actuation nasal spray Commonly known as:  NASONEX  
USE 2 SPRAYS IN BOTH NOSTRILS DAILY AS NEEDED  
  
 omeprazole 40 mg capsule Commonly known as:  PRILOSEC Take 1 Cap by mouth daily for 30 days. potassium chloride SR 10 mEq tablet Commonly known as:  KLOR-CON 10 Take 1 Tab by mouth every Monday, Wednesday, Friday. TYLENOL 325 mg tablet Generic drug:  acetaminophen Take  by mouth every four (4) hours as needed for Pain. Prescriptions Sent to Pharmacy Refills  
 omeprazole (PRILOSEC) 40 mg capsule 0 Sig: Take 1 Cap by mouth daily for 30 days. Class: Normal  
 Pharmacy: [address] #: [phone] Route: Oral  
  
Introducing [facility] & HEALTH SERVICES! Dear Nick Husbands: Thank you for requesting a Allihub account. Our records indicate that you already have an active Allihub account. You can access your account anytime at [email or web address] Did you know that you can access your hospital and ER discharge instructions at any time in Allihub? You can also review all of your test results from your hospital stay or ER visit. Additional Information If you have questions, please visit the Frequently Asked Questions section of the Allihub website at [email or web address] Remember, MyChart is NOT to be used for urgent needs. For medical emergencies, dial 911. Now available from your iPhone and Android! Please provide this summary of care documentation to your next provider. Your primary care clinician is listed as Iram Zurita. If you have any questions after today's visit, please call [phone].

## 2018-04-11 NOTE — PROGRESS NOTES
Written by Fatoumata Toussaint, as dictated by Dr. Librado Cody MD.    Jama Menchaca is a 80 y.o. female. HPI  The patient comes in today for an ED follow-up. She went to the ED at [facility] on 04/02 c/o upper abdominal pain. Abdominal CT showed a moderately sized hiatal hernia. She was given Carafate to take QID, which she has been having trouble taking due to the size. She is not taking omeprazole. She has lost weight, from 158 lbs in 08/2017 to 153 lbs today. She has been cutting down on her carbohydrates. She was also found to have a UTI so she took abx for 7 days. She has been experiencing urinary frequency and burning. She has been experiencing leg cramps. She has been taking Lipitor three times per week. She has not been sleeping well: she falls asleep fine, but wakes up in the middle of the night and cannot fall back asleep for 2-3 hours. She does not take naps during the day. Her L ear has been hurting. She has earwax removed every 6 months. She is taking Zyrtec, Xanax 0.25 mg every night, chlorthalidone 12.5 mg, Lipitor three times per week. She is not taking Lyrica. Patient Active Problem List   Diagnosis Code    Hypertension I10    Hyperlipidemia E78.5    Bladder cancer ([facility] [address] C67.9    DJD (degenerative joint disease) M19.90    Hip fracture ([facility] [address] S72.009A    Knee pain, right M25.561        Current Outpatient Prescriptions on File Prior to Visit   Medication Sig Dispense Refill    sucralfate (CARAFATE) 100 mg/mL suspension Take  by mouth four (4) times daily.  ALPRAZolam (XANAX) 0.5 mg tablet Take 1 Tab by mouth nightly as needed for Anxiety for up to 20 doses. Max Daily Amount: 0.5 mg. (Patient taking differently: Take 0.5 mg by mouth nightly as needed for Anxiety. 0.5 tablets once nightly at bedtime.) 20 Tab 0    potassium chloride SR (KLOR-CON 10) 10 mEq tablet Take 1 Tab by mouth every Monday, Wednesday, Friday.  13 Tab 5    chlorthalidone (HYGROTEN) 25 mg tablet Take 0.5 Tabs by mouth daily for 90 days. Pt instructed to take half of the 25 mg pill. 90 Tab 0    atorvastatin (LIPITOR) 10 mg tablet Take 1 Tab by mouth Every Mon, Wed & Sun for 90 doses. 90 Tab 0    fluticasone (FLONASE) 50 mcg/actuation nasal spray 2 Sprays by Both Nostrils route daily.  ketorolac (ACULAR) 0.5 % ophthalmic solution PLACE 1 GTT INTO OS D  6    meclizine (ANTIVERT) 25 mg tablet Take 1 Tab by mouth three (3) times daily as needed. Chewable tabs 90 Tab 1    Cholecalciferol, Vitamin D3, 50,000 unit cap Take 1 Cap by mouth every fourteen (14) days. Indications: VITAMIN D DEFICIENCY 6 Cap 4    cyanocobalamin (VITAMIN B-12) 1,000 mcg sublingual tablet Take 1 Tab by mouth daily. 90 Tab 3    furosemide (LASIX) 20 mg tablet 3 times a week. 30 Tab 2    albuterol (PROVENTIL VENTOLIN) 2.5 mg /3 mL (0.083 %) nebulizer solution 1.5 mL by Nebulization route every four (4) hours as needed for Wheezing. 24 Each 1    acetaminophen (TYLENOL) 325 mg tablet Take  by mouth every four (4) hours as needed for Pain.  saccharomyces boulardii (FLORASTOR) 250 mg capsule Take 250 mg by mouth daily.  aspirin delayed-release 81 mg tablet Take 81 mg by mouth daily.  mometasone (NASONEX) 50 mcg/actuation nasal spray USE 2 SPRAYS IN BOTH NOSTRILS DAILY AS NEEDED (Patient not taking: No sig reported) 1 Container 2    iron gly,[id number] (MAXARON FORTE) 150 mg iron-200 mg-250 mcg tab As directed. (Patient taking differently: weekly) 90 Tab 2     No current facility-administered medications on file prior to visit.         Allergies   Allergen Reactions    Influenza Tri-Split 08-09 Vac Other (comments)     High fever 104degrees    Codeine Shortness of Breath    Darvon [Propoxyphene] Shortness of Breath    Morphine Palpitations    Pcn [Penicillins] Hives    Percocet [Oxycodone-Acetaminophen] Unknown (comments)     Says unsure was a long time ago Past Medical History:   Diagnosis Date    Acute on chronic diastolic heart failure ([facility] [address] 3/27/2012    Arthritis     Calculus of kidney     Cancer ([facility] [address]     bladder    Cataract     in  and implant inserted left eye    Cataract     left eye    Chronic pain     GERD (gastroesophageal reflux disease)     Heart attack ([facility] [address]     in     Hypercholesterolemia     Thromboembolus [facility])        Past Surgical History:   Procedure Laterality Date    BREAST SURGERY PROCEDURE UNLISTED      CARDIAC SURG PROCEDURE UNLIST      stent inserted 2002    HX CHOLECYSTECTOMY      HX GYN       x 4    HX HEENT      HX ORTHOPAEDIC      HX UROLOGICAL      found small tumor    HX WRIST FRACTURE [place]  Oct 2014       Family History   Problem Relation Age of Onset    Heart Disease Mother     Hypertension Mother     Heart Disease Brother     Cancer Brother     Other Brother      ortho    Heart Disease Maternal Aunt     Arthritis-rheumatoid Maternal Aunt     Heart Disease Maternal Uncle     Arthritis-rheumatoid Maternal Uncle     Heart Disease Maternal Grandmother     Stroke Maternal Grandmother     Kidney Disease Other        Social History     Social History    Marital status:      Spouse name: N/A    Number of children: N/A    Years of education: N/A     Occupational History    Not on file. Social History Main Topics    Smoking status: Former Smoker    Smokeless tobacco: Never Used      Comment: quit     Alcohol use No    Drug use: No    Sexual activity: Not Currently      Comment:      Other Topics Concern    Not on file     Social History Narrative       Review of Systems   Constitutional: Negative for malaise/fatigue. HENT: Positive for ear pain. Negative for congestion. Respiratory: Positive for cough. Negative for shortness of breath. Gastrointestinal: Positive for abdominal pain and heartburn. Genitourinary: Negative for frequency and urgency. Musculoskeletal: Positive for myalgias. Negative for joint pain. Neurological: Negative for weakness. Psychiatric/Behavioral: Negative for depression, memory loss and substance abuse. Visit Vitals    /62 (BP 1 Location: Left arm, BP Patient Position: Sitting)    Pulse (!) 59    Temp 98.1 °F (36.7 °C) (Oral)    Ht 5' 2\" (1.575 m)    Wt 153 lb 8 oz (69.6 kg)  Comment: patient reported weight at home this morning    SpO2 98%    BMI 28.08 kg/m2       Physical Exam   Constitutional: She is oriented to person, place, and time. She appears well-developed and well-nourished. HENT:   Right Ear: External ear normal.   Left Ear: External ear normal.   Eyes: Conjunctivae and EOM are normal.   Neck: Normal range of motion. Neck supple. Cardiovascular: Normal rate and regular rhythm. Pulmonary/Chest: Effort normal and breath sounds normal. She has no wheezes. Abdominal: Soft. Bowel sounds are normal. There is no tenderness. Musculoskeletal:   Crepitus in both knees. 1+ pitting edema both lower extremities. Neurological: She is alert and oriented to person, place, and time. Psychiatric: She has a normal mood and affect. Nursing note and vitals reviewed. ASSESSMENT and PLAN    ICD-10-CM ICD-9-CM    1. Other acute gastritis without hemorrhage K29.00 535.00 omeprazole (PRILOSEC) 40 mg capsule sent to pharmacy   2. Hiatal hernia K44.9 553.3 omeprazole (PRILOSEC) 40 mg capsule sent to pharmacy    Prilosec given, which she should take on an empty stomach first thing in the morning. Discussed that hiatal hernia could be causing her cough. 3. Essential hypertension I10 401.9 BP is well-controlled on current medication. No change to dosage at this time. 4. Primary osteoarthritis of both knees M17.0 715.16 She has been taking Tylenol. 5. Leg swelling M79.89 729.81 I want her to take Lasix twice per week. She should not take her BP medication on the days she takes Lasix.      6. Primary insomnia F51.01 307.42 Recommended trying Tylenol PM and/or melatonin. This plan was reviewed with the patient and patient agrees. All questions were answered. This scribe documentation was reviewed by me and accurately reflects the examination and decisions made by me. This note will not be viewable in [address]

## 2018-04-11 NOTE — PROGRESS NOTES
Chief Complaint   Patient presents with    Hiatal Hernia     follow up, patient had an exam at [facility] due to c/o abdominal pain last week     Derik Terry is a 80 y.o. female that is here today for a follow up from the ED for a hiatal hernia, patient also c/o choking on her spit often, states that this has been occurring for over a year now. 1. Have you been to the ER, urgent care clinic since your last visit? Hospitalized since your last visit? Yes When: [facility] ED, Monday 4/2/18    2. Have you seen or consulted any other health care providers outside of the [address] [place] [address] since your last visit? Include any pap smears or colon screening.  No

## 2018-04-16 DIAGNOSIS — E78.2 MIXED HYPERLIPIDEMIA: ICD-10-CM

## 2018-04-16 RX ORDER — ATORVASTATIN CALCIUM 10 MG/1
10 TABLET, FILM COATED ORAL
Qty: 90 TAB | Refills: 0 | Status: SHIPPED | OUTPATIENT
Start: 2018-04-16 | End: 2018-01-01 | Stop reason: SDUPTHER

## 2018-05-04 NOTE — PROGRESS NOTES
NNTOCED-     Seen at [facility] ED on 5/3/18     Chief Complaint- Fall      ED Evaluation- EKG, Right foot xray negative, Right hip and pelvis xray showed old fracture healed, right knee xray showed severe OA, right shoulder xray showed OA no fracture. Left wrist xray negative, left hand xray showed OA no fracture. Patient told to follow up PCP 2-4 days.       Follow up-= NN called and spoke with patients daughter Lela Terrazas. She reports patient fell twice in the last two days. NN inquired as to whether patient having any urinary sx's as she was treated within the last month for UTI and it is common for elderly patients with UTI to have altered mentation resulting in falls. She reports no sx's of UTI but notes that no urine was checked in the ED. She does note patient has been c/o dizziness and usually has earwax impaction removed routinely by ENT but missed her last appt 2 months ago due to bad weather. This could be the cause and she is going to make ENT appt. She isn't sure she feels patient needs to see PCP for follow up but she may bring in a cup of her urine to have UA checked if Dr Tina Casillas agrees. She will call us back after follow up with ENT.       Plan- Patients daughter plans to see ENT first as usually when she gets dizzy it is due to earwax impaction. This could be the cause of her dizziness and falls. If no solution here will call to schedule a follow up with PCP. May also bring in a urine to have UA checked just to verify no UTI.       Goals      reduce the risk of falls            5/4/18- Patients daughter will schedule her to see ENT for earwax removal as she is c/o dizziness which often comes from when she has earwax buildup and this could be contributing to her falls. She will monitor her closely and assist her when transferrring to avoid falls.  HA

## 2018-05-15 NOTE — PROGRESS NOTES
NNTOCED-     Seen at [facility]  ED on 5/13/18     Chief Complaint- chest pain      ED Evaluation- chest xray negative, EKG WNL, Large hiatal hernia started on Carafate.        Follow up- patient daughter declined pcp follow up at this time. She needs to followup with ortho first tomorrow as her knees \"keep buckling on her\" and she has been falling a lot and having a hard time standing/ walking. She will follow up with PCP after ortho.      Plan- see ortho for recurrent falls then see pcp/ for chest pain  Due to hiatal hernia she will avoid acidic foods in her diet and avoid greasy/fatty fried foods and will take Carafate as ordered.      Goals      reduce the risk of falls            5/15/18- to see ortho tomrrow- hoping they will orher [facility]. If not she will call PCP back to get order for [facility] as patient has had frequent falls and needs  PT to help strengthen her. LN    5/4/18- Patients daughter will schedule her to see ENT for earwax removal as she is c/o dizziness which often comes from when she has earwax buildup and this could be contributing to her falls. She will monitor her closely and assist her when transferrring to avoid falls. LN       take Carefate as ordered             5/15/18- patients daughter will assist/  Encourage patient to take the Carafate as ordered.  HA

## 2018-09-27 NOTE — PROGRESS NOTES
NN left message for patients daughter to see how patient has been and provided contact information for return call. NN will now close this episode. Patient has graduated from the Transitions of Care Coordination  program on 6/13/18. Patient's symptoms are stable at this time. Patient/family has the ability to self-manage. Care management goals have been completed at this time. No further nurse navigator follow up scheduled. Pt has nurse navigator's contact information for any further questions, concerns, or needs. Patients upcoming visits:  No future appointments. Goals Addressed  COMPLETED: reduce the risk of falls 5/15/18- to see ortho tomrrow- hoping they will orher [facility]. If not she will call PCP back to get order for [facility] as patient has had frequent falls and needs  PT to help strengthen her. LN 
 
5/4/18- Patients daughter will schedule her to see ENT for earwax removal as she is c/o dizziness which often comes from when she has earwax buildup and this could be contributing to her falls. She will monitor her closely and assist her when transferrring to avoid falls. LN 
  
  COMPLETED: take Carefate as ordered 5/15/18- patients daughter will assist/  Encourage patient to take the Carafate as ordered.  LN

## 2018-10-09 NOTE — PROGRESS NOTES
Rex Herrera is a 80 y.o. female and presents for Annual Medicare Wellness Visit. Assessment of cognitive impairment: Alert and oriented x 3. Depression Screen: PHQ over the last two weeks 10/9/2018 Little interest or pleasure in doing things Not at all Feeling down, depressed, irritable, or hopeless Not at all Total Score PHQ 2 0 Fall Risk Assessment:   
Fall Risk Assessment, last 12 mths 10/9/2018 Able to walk? Yes Fall in past 12 months? No  
Fall with injury? -  
Number of falls in past 12 months - Fall Risk Score -  
 
 
Abuse Screen:  
Abuse Screening Questionnaire 10/9/2018 Do you ever feel afraid of your partner? Demar Yuen Are you in a relationship with someone who physically or mentally threatens you? Demar Yuen Is it safe for you to go home? Kylie Duenas Activities of Daily Living: 
Partial assistance. Requires assistance with: dressing, walking, shopping, transportation Patient handle his/her own medications  yes Use of pill box  yes Activities of Daily Living: ADL Assessment 10/9/2018 Feeding yourself No Help Needed Getting from bed to chair No Help Needed Getting dressed Help Needed Bathing or showering Help Needed Walk across the room (includes cane/walker) Help Needed Using the telphone No Help Needed Taking your medications No Help Needed Preparing meals Help Needed Managing money (expenses/bills) No Help Needed Moderately strenuous housework (laundry) Help Needed Shopping for personal items (toiletries/medicines) Help Needed Shopping for groceries Help Needed Driving Help Needed Climbing a flight of stairs Help Needed Getting to places beyond walking distances Help Needed Health Maintenance: 
Daily Aspirin: recommended to start daily 81mg Bone Density: patient declined Glaucoma Screening: yes 09/2018 Immunizations:  
 Tetanus: patient declines. Influenza: patient declines.   Shingles: information given to patient. PPSV-23: Patient Declines . Prevnar-13: Patient Declines . Cancer screening:  
 Cervical: Not needed   Breast: not needed   Colon: Patient Declines   Prostate: N/A Alcohol Risk Screen On any occasion during the past 3 months, have you had more than 3 drinks(female) or 4 drinks (male) containing alcohol in one? No 
Do you average more than 7 drinks (female) or 14 drinks (male) per week? No 
Type and amount:N/A Hearing Loss: 
The patient wears hearing aids. denies any hearing loss wears hearing aides Vision Loss:  
Wears glasses, contact lenses, or have any other visual impairment  yes Adult Nutrition Screen: No risk factors noted. Advance Care Planning:  
End of Life Planning: has an advanced directive - a copy has been provided, Crow Dowell ACP-Facilitator appointment yes Medications/Allergies: Reviewed with patient Prior to Admission medications Medication Sig Start Date End Date Taking? Authorizing Provider  
atorvastatin (LIPITOR) 10 mg tablet Take 1 Tab by mouth Every Mon, Wed & Sun for 90 doses. 9/26/18 4/23/19  Opal Quinones NP  
sucralfate (CARAFATE) 100 mg/mL suspension Take 5 mL by mouth four (4) times daily. 9/4/18   Ry Wylie MD  
ALPRAZolam Dorotha Lambert) 0.5 mg tablet Take 1 Tab by mouth nightly as needed for Anxiety for up to 20 doses. Max Daily Amount: 0.5 mg. 9/4/18   Ry Wylie MD  
potassium chloride SR (KLOR-CON 10) 10 mEq tablet Take 1 Tab by mouth every Monday, Wednesday, Friday. 8/13/18   Ry Wylie MD  
chlorthalidone (HYGROTEN) 25 mg tablet Take 0.5 Tabs by mouth daily for 90 days. Pt instructed to take half of the 25 mg pill. 8/13/18 11/11/18  Ry Wylie MD  
albuterol (PROVENTIL VENTOLIN) 2.5 mg /3 mL (0.083 %) nebulizer solution 1.5 mL by Nebulization route every four (4) hours as needed for Wheezing.  7/2/18   Ry Wylie MD  
 cholecalciferol (VITAMIN D3) 50,000 unit capsule Take 1 Cap by mouth every fourteen (14) days. Indications: Vitamin D Deficiency 5/9/18   Garrett Toure MD  
cetirizine (ZYRTEC) 10 mg tablet Take  by mouth daily. Historical Provider  
fluticasone (FLONASE) 50 mcg/actuation nasal spray 2 Sprays by Both Nostrils route daily. Historical Provider  
ketorolac (ACULAR) 0.5 % ophthalmic solution PLACE 1 GTT INTO OS D 5/11/17   Historical Provider  
meclizine (ANTIVERT) 25 mg tablet Take 1 Tab by mouth three (3) times daily as needed. Chewable tabs 5/12/17   Bailey Jaramillo NP  
cyanocobalamin (VITAMIN B-12) 1,000 mcg sublingual tablet Take 1 Tab by mouth daily. 4/7/17   Bailey Jaramillo NP  
furosemide (LASIX) 20 mg tablet 3 times a week. 9/23/16   Garrett Toure MD  
mometasone (NASONEX) 50 mcg/actuation nasal spray USE 2 SPRAYS IN BOTH NOSTRILS DAILY AS NEEDED Patient not taking: No sig reported 9/14/16   Garrett Toure MD  
iron gly,[id number] (MAXARON FORTE) 150 mg iron-200 mg-250 mcg tab As directed. Patient taking differently: weekly 7/20/16   Garrett Toure MD  
acetaminophen (TYLENOL) 325 mg tablet Take  by mouth every four (4) hours as needed for Pain. Historical Provider  
saccharomyces boulardii (FLORASTOR) 250 mg capsule Take 250 mg by mouth daily. Historical Provider  
aspirin delayed-release 81 mg tablet Take 81 mg by mouth daily. Historical Provider Allergies Allergen Reactions  Influenza Tri-Split 08-09 Vac Other (comments) High fever 104degrees  Codeine Shortness of Breath  Darvon [Propoxyphene] Shortness of Breath  Morphine Palpitations  Pcn [Penicillins] Hives  Percocet [Oxycodone-Acetaminophen] Unknown (comments) Says unsure was a long time ago Objective: 
Visit Vitals  /60 (BP 1 Location: Left arm, BP Patient Position: Sitting)  Pulse 72  Temp 97.7 °F (36.5 °C) (Oral)  Resp 18  Ht 5' 2\" (1.575 m)  Wt 148 lb (67.1 kg) Comment: Per history, unable to stand independently  SpO2 98%  BMI 27.07 kg/m2 Body mass index is 27.07 kg/(m^2). Problem List: Reviewed with patient and discussed risk factors. Patient Active Problem List  
Diagnosis Code  Hypertension I10  
 Hyperlipidemia E78.5  Bladder cancer ([facility] [address] C67.9  DJD (degenerative joint disease) M19.90  
 Hip fracture ([facility] [address] S72.009A  Knee pain, right M25.561 PSH: Reviewed with patient Past Surgical History:  
Procedure Laterality Date  BREAST SURGERY PROCEDURE UNLISTED  CARDIAC SURG PROCEDURE UNLIST    
 stent inserted 2002  HX CHOLECYSTECTOMY  HX GYN    
  x 4  
 HX HEENT    
 HX ORTHOPAEDIC    
 HX UROLOGICAL    
 found small tumor [place] WRIST FRACTURE [place]  Oct 2014 SH: Reviewed with patient Social History Substance Use Topics  Smoking status: Former Smoker  Smokeless tobacco: Never Used Comment: quit   Alcohol use No  
 
 
FH: Reviewed with patient Family History Problem Relation Age of Onset  Heart Disease Mother  Hypertension Mother  Heart Disease Brother  Cancer Brother  Other Brother   
  ortho  
 Heart Disease Maternal Aunt  Arthritis-rheumatoid Maternal Aunt  Heart Disease Maternal Uncle  Arthritis-rheumatoid Maternal Uncle  Heart Disease Maternal Grandmother  Stroke Maternal Grandmother  Kidney Disease Other Current medical providers:   
Patient Care Team: 
Rigo Nelson MD as PCP - Sarah Alaniz RN as Ambulatory Care Navigator (Internal Medicine) Plan:   
Diagnoses and all orders for this visit: 
 
1. Medicare annual wellness visit, subsequent Immunization & health screening discussed with her & her daughter. They are well aware of consequences of not getting certain vaccines. Health Maintenance Topic Date Due  Shingrix Vaccine Age 50> (1 of 2) 1974  GLAUCOMA SCREENING Q2Y  08/13/1989  MEDICARE YEARLY EXAM  06/29/2018  Influenza Age 5 to Adult  08/01/2018  DTaP/Tdap/Td series (2 - Td) 06/14/2026  Bone Densitometry (Dexa) Screening  Addressed  Pneumococcal 65+ High/Highest Risk  Addressed Urinary/ Fecal Incontinence: Denies Urinary and Fecal Incontinence Regular physical exercise: Walk as tolerated, some PT exercises Patient verbalized understanding of information presented. AVS and Medicare Part B Preventive Services Table printed and given to pt and reviewed. See table for findings under Recommendation and Scheduled. All of the patient's questions were answered. Patient also seen for cough & shortness of breath with exertion. Since last seen in the office she has been to ER 3 times. She lives with her daughter who is concerned about her mobility & risk of fall as she has been unsteady on her feet. She has been feeling tired lately , has stopped taking Lyrica. She is still taking Chlorthalidone daily & taking potassium 3 times a week. As per her daughter she has been having persistent cough which is worse at night time. She does have a history of Hiatal Hernia for which she takes Prilosec as needed. Her reflux gets bad with fried food. She is having pain in both knees & it gets worse with walking or putting any pressure on legs. She has been loosing weight as she can`t eat much due to hiatal hernia. She has to chew & swallow food slowly otherwise she gets coughing spells. Review of Systems Constitutional: Negative for fever, chills , + malaise. HENT: Negative for congestion. Eyes: Negative for blurred vision and discharge. Respiratory: + shortness of breath and no  wheezing. Cardiovascular: Negative for chest pain, palpitations Gastrointestinal: Negative for nausea and vomiting , no diarrhea Genitourinary: Negative for urgency and frequency. Musculoskeletal: positive  for myalgias and joint pain. Skin: Negative for rash and itching. Neurological: Negative for dizziness, sensory change or memory loss Endo/Heme/Allergies: She does get bruise  easily. Psychiatric/Behavioral: Negative for substance abuse. The patient does not have insomnia. Physical Exam  
Constitutional: She is oriented. She appears well-developed and well-nourished. HENT:  
Mouth/Throat: Oropharynx is clear and moist. No oropharyngeal exudate. Eyes: Conjunctivae and extraocular motions are normal. Pupils are equal, round, and reactive to light. Neck: Normal range of motion. Neck supple. Cardiovascular: Regular rhythm and + heart murmur. Pulmonary/Chest: decrease bibasilar breath sounds. No respiratory distress. She has no wheezes. Abdominal: Soft. Bowel sounds are normal. She exhibits no distension. Musculoskeletal: + knee crepitus b/l , trace pitting edema in both lower extremities Percell Brakeman Neurological: She is oriented. . No cranial nerve deficit. Skin: She is not diaphoretic, + ecchymosis on both forearms & lower extremities. Psychiatric: She has a normal mood and affect. Her behavior is normal.  
 
Diagnoses and all orders for this visit: 1. Dyspnea on exertion Told her most likely due to decrease endurance. Will check hemoglobin. 2. Impaired mobility and endurance I did recommend PT through home health . They don`t want the same home Health they had it last time . Other agencies names given 3. Essential hypertension Repeat blood pressure reading in normal range. Her readings run in normal range at home. 4  Anxiety Not taking xanax lately. Does not want to take any medication. 5. Iron deficiency anemia, unspecified iron deficiency anemia type -     METABOLIC PANEL, COMPREHENSIVE 
-     CBC W/O DIFF 6. Primary osteoarthritis of both knees She gets topical anti inflammatory cream from compounding pharmacy as can`t take NSAIDS due to elevated creatinine. 8. Pleural effusion, bilateral 
 X-ray report from [facility] ER reviewed. Effusion is improving. Told her she needs chest PT as well. -     benzonatate (TESSALON) 200 mg capsule; Take 1 Cap by mouth three (3) times daily as needed for Cough for up to 7 days. 9. Cough, persistent Explained to her & her daughter nocturnal cough is due to Hiatal hernia. Told her to take Prilosec daily before breakfast for 4 weeks & let me know if symptoms improve. Not a surgical candidate.

## 2018-10-10 NOTE — PROGRESS NOTES
Result discussed with Caroline Fu who is POA. Told her about taking potassium & will add Magnesium. Iron tablet sent to the pharmacy. Please add Magnesium to her labs.

## 2018-10-12 NOTE — TELEPHONE ENCOUNTER
----- Message from Linnea Hendrickson sent at 10/11/2018  2:12 PM EDT -----  Regarding: Dr. Antwon Apple, [address] [facility] checking on the status of paperwork order for skilled nursing homehealth care. Requesting a call back.    Dariela Ireland contact [phone]

## 2018-10-16 NOTE — TELEPHONE ENCOUNTER
Drug Change Request from [facility]    Drug: Clide Kole TABLETS  SIG: TAKE 1 TABLET BY MOUTH DAILY      Message: This has been discontinued. Please fax alternative drug with approval along with strength, directions, quantity, and refills.

## 2018-10-25 NOTE — TELEPHONE ENCOUNTER
Last office visit 10/9/2018  Last med refill 9/4/2018  Patient requested yesterday and was here today to  with out it having been called that it was ready.

## 2018-10-26 NOTE — TELEPHONE ENCOUNTER
Jaya Becerra has been waiting on this rx to be completed. She really needs to pick it up in the next hour or so.  Please contact her once complete/

## 2018-10-29 NOTE — TELEPHONE ENCOUNTER
Re: Refill of Xanax 0.5mg   Received communication that patient still did not have an RX for Xanax. Review of chart indicates an approved refill for Xanax 0.5mg on 10/22/2018 and again on 10/26/2018.

## 2018-11-08 NOTE — TELEPHONE ENCOUNTER
Please call and follow up with Sofia Rogers [phone].  Ms. Ulices Lantigua is out of meds tomorrow and needs this taken care of

## 2018-11-14 NOTE — TELEPHONE ENCOUNTER
Confirmed speaking with Mikey Books, states that when arriving yesterday to perform patients pt, patient was complaining a bout back pain 10/10 which radiating down the left leg.   States that bp was also elevated to 184/78,  Please advise

## 2019-01-01 ENCOUNTER — TELEPHONE (OUTPATIENT)
Dept: PRIMARY CARE CLINIC | Age: 84
End: 2019-01-01

## 2019-01-01 ENCOUNTER — PATIENT OUTREACH (OUTPATIENT)
Dept: PRIMARY CARE CLINIC | Age: 84
End: 2019-01-01

## 2019-01-01 DIAGNOSIS — R45.1 AGITATION: Primary | ICD-10-CM

## 2019-01-01 DIAGNOSIS — I35.0 NONRHEUMATIC AORTIC VALVE STENOSIS: ICD-10-CM

## 2019-01-01 DIAGNOSIS — I35.0 AORTIC STENOSIS, SEVERE: Primary | ICD-10-CM

## 2019-01-01 RX ORDER — OMEPRAZOLE 40 MG/1
40 CAPSULE, DELAYED RELEASE ORAL DAILY
COMMUNITY

## 2019-01-01 RX ORDER — FLUTICASONE FUROATE AND VILANTEROL 100; 25 UG/1; UG/1
1 POWDER RESPIRATORY (INHALATION) DAILY
COMMUNITY

## 2019-01-01 RX ORDER — BUSPIRONE HYDROCHLORIDE 10 MG/1
5 TABLET ORAL 2 TIMES DAILY
COMMUNITY

## 2019-01-01 RX ORDER — RISPERIDONE 0.5 MG/1
0.5 TABLET, FILM COATED ORAL
Qty: 30 TAB | Refills: 0 | Status: SHIPPED | OUTPATIENT
Start: 2019-01-01 | End: 2019-01-01

## 2019-01-01 RX ORDER — SERTRALINE HYDROCHLORIDE 50 MG/1
TABLET, FILM COATED ORAL DAILY
COMMUNITY

## 2019-01-01 RX ORDER — LEVOFLOXACIN 750 MG/1
750 TABLET ORAL DAILY
COMMUNITY

## 2019-03-14 NOTE — TELEPHONE ENCOUNTER
Patients daughter called wanting to update Dr. Ralph Pate with Ms. [facility]. Please call Ms. Toni Rivera to talk with her. She specifically requested to talk with Dr. aRlph Pate.

## 2019-03-25 NOTE — PROGRESS NOTES
Hospital Discharge Follow-Up Date/Time:  3/25/2019 3:51 PM 
 
Patient was admitted to [facility] on 3/21/19 and discharged on 3/23/19 for Hypoxemic respiratory failure. The physician discharge summary was available at the time of outreach. Patient was contacted within 2 business days of discharge. Top Challenges reviewed with the provider - Patients family requesting referral to hospice- verbal order received from PCP Dr Bennett Nieves and referral send to [facility]ice - family declined to come in for office follow up due to patients decline/ lack of mobility and preference to redirect to comfort focused care Method of communication with provider :face to face Inpatient RRAT score: not available Was this a readmission? yes - was admitted to [facility] prior from -3/5 for septic ischemic colitis, and prior to that was admitted for fall with cerebral bleeding Patient stated reason for the readmission: shortness of breath- need for continuous oxygen Nurse Navigator (NN) contacted the family by telephone to perform post hospital discharge assessment. Verified name and  with family as identifiers. Provided introduction to self, and explanation of the Nurse Navigator role. Reviewed discharge instructions and red flags with family who verbalized understanding. Family given an opportunity to ask questions and does not have any further questions or concerns at this time. The family agrees to contact the PCP office for questions related to their healthcare. NN provided contact information for future reference. Disease Specific:   N/A Summary of patient's top problems: 1. Hypoxemic respiratory failure-  Multifactorial, pleural effusion ? Secondary to CHF in the setting of severe aortic stenosis, and mild tracheal decompression due to large substernal goiter, and ? Aspiration pneumonia.  Patient treated w Rocephin and Flagyl and discharged on Levaquin. Patient now on continuous home 02 at 2 liters. Suggested patient follow dysphagia diet due to aspiration risk per speech. Patient does not want any hospitalization at this point. Family has decided on hospice. Per family patient is breathing okay at home now on 02 but they understand she is declining from multiple fronts and she would prefer no further hospital admissions. Referral placed to [facility] and hospice. They will call to arrange meeting with family today while all siblings are available from out of town 2. Right common femoral vein DVT- found on doppler not a candidate for anticoagulation due to history of recent fall with subdural brain bleeding. Family does not want IVC filter due to risks. 3. Dysphagia- patient found at risk for aspiration - family does not want to pursue any further intervention and would prefer hospice care at this point. They were educated on sitting patient upright at 90 degrees for meals and having her sit up 45 post meals and using soft/pureed foods with small bites and sips. Home Health orders at discharge: PT, SN [address]: [facility] home care to open but will likely be cancelled once hospice is initiated Date of initial visit:  
 
Durable Medical Equipment ordered/company: Home 02 was received upon dc from [facility] Durable Medical Equipment received: 
 
Barriers to care? depression, stages of grief - family very supportive- spoke with maryjane children on conference call today and they are in agreement the patient has made clear she is \"tired\" and doesn't want to return to a hospital at any point - they all understand she is declining and too much is failing at this point they are pursuing hospice care Advance Care Planning:  
Does patient have an Advance Directive:  reviewed and current  / osmisty [address]. Medication(s):  
New Medications at Discharge: KCL 20 meq BID , Levaquin Changed Medications at Discharge: albuterol nebs, lasix increased 20 mg to 40 mg Discontinued Medications at Discharge: KCL 10 meq, lipitor Medication reconciliation was performed with family, who verbalizes understanding of administration of home medications. There were no barriers to obtaining medications identified at this time. Referral to Pharm D needed: no  
 
Current Outpatient Medications Medication Sig  
 amiodarone (CORDARONE) 200 mg tablet Take  by mouth daily.  omeprazole (PRILOSEC) 40 mg capsule Take 40 mg by mouth daily.  busPIRone (BUSPAR) 10 mg tablet Take 5 mg by mouth two (2) times a day.  fluticasone furoate-vilanterol (BREO ELLIPTA) 100-25 mcg/dose inhaler Take 1 Puff by inhalation daily.  sertraline (ZOLOFT) 50 mg tablet Take  by mouth daily.  levoFLOXacin (LEVAQUIN) 750 mg tablet Take 750 mg by mouth daily.  cyanocobalamin (VITAMIN B-12) 1,000 mcg sublingual tablet Take 1 Tab by mouth daily.  albuterol (PROVENTIL VENTOLIN) 2.5 mg /3 mL (0.083 %) nebulizer solution 1.5 mL by Nebulization route every four (4) hours as needed for Wheezing.  cholecalciferol (VITAMIN D3) 50,000 unit capsule Take 1 Cap by mouth every fourteen (14) days. Indications: Vitamin D Deficiency  ketorolac (ACULAR) 0.5 % ophthalmic solution PLACE 1 GTT INTO OS D  
 furosemide (LASIX) 20 mg tablet 3 times a week.  risperiDONE (RISPERDAL) 0.5 mg tablet Take 1 Tab by mouth nightly for 30 days.  CARAFATE 100 mg/mL suspension SHAKE LIQUID AND TAKE 5ML BY MOUTH FOUR TIMES DAILY  iron gly,[id number]-[id number] (MAXARON FORTE) 150 mg iron-200 mg-250 mcg tab 1 tablet daily.  Iron BisGl &PS [id number] (HILARIA FORTE) 613-22-84-0 mg-mg-mcg-mg cap 1 tablet daily.  atorvastatin (LIPITOR) 10 mg tablet Take 1 Tab by mouth Every Mon, Wed & Sun for 90 doses.  sucralfate (CARAFATE) 100 mg/mL suspension Take 5 mL by mouth four (4) times daily.  potassium chloride SR (KLOR-CON 10) 10 mEq tablet Take 1 Tab by mouth every Monday, Wednesday, Friday.  cetirizine (ZYRTEC) 10 mg tablet Take  by mouth daily.  meclizine (ANTIVERT) 25 mg tablet Take 1 Tab by mouth three (3) times daily as needed. Chewable tabs  mometasone (NASONEX) 50 mcg/actuation nasal spray USE 2 SPRAYS IN BOTH NOSTRILS DAILY AS NEEDED (Patient not taking: No sig reported)  saccharomyces boulardii (FLORASTOR) 250 mg capsule Take 250 mg by mouth daily.  aspirin delayed-release 81 mg tablet Take 81 mg by mouth daily. No current facility-administered medications for this visit. Medications Discontinued During This Encounter Medication Reason  ALPRAZolam (XANAX) 0.5 mg tablet Not A Current Medication  ALPRAZolam (XANAX) 0.5 mg tablet Not A Current Medication [facility] follow up appointment(s): No future appointments. Non-[facility] follow up appointment(s): N/a patient will intiate care with home hospice [facility]:  n/a  
 
 
Goals  transition into hospice care (pt-stated) 3/25/19- spoke with patients family today and all questions answered. Decision was made to transition into hospice care. Verbal order from Dr Stefany Perkins given and referral faxed to [facility] home hospice who will contact the family today to schedule a meeting. NN will follow up with patient /family in the next two days to ensure hospice has connected and she plans to continue with them.  HA

## 2019-03-26 NOTE — PROGRESS NOTES
NN contacted patients daughter Dipti Camarillo to ensure hospice has contacted them to schedule. Dipti Camarillo verified that the hospice nurse from [facility] home hospice is coming out to the home at 2 pm today to meet with patient/ family to discuss hospice and evaluate patient. Dipti Camarillo does note that even with 2l 02 on this AM patients 02 sats were 90-92%. NN d/w her to continue to check and let us know if she runs below 90 - explained that in a patient with COPD that is an acceptable range if patient is not symptomatic as increasing 02 too much could take away her drive to breath properly. She verbalized understanding. PCP spoke with patients dtr during this call as well. NN encouraged her to call NN with any questions/ cm needs. Will follow.

## 2019-04-02 NOTE — PROGRESS NOTES
PCP confirmed yesterday that patients family has opened patient to home hospice services and PCP completed necessary documents for hospice. Patient will now be managed primarily by the hospice team. NN has encouraged patients daughter/family to reach out with any questions or concerns along the way. NN will now close this NN episode and provide any guidance or support as needed in the future. Patient has graduated from the Transitions of Care Coordination  program on 4/2/19- patient has transitioned to hospice care . Patient's symptoms are stable at this time. Patient/family has the ability to self-manage. Care management goals have been completed at this time. No further nurse navigator follow up scheduled. Pt has nurse navigator's contact information for any further questions, concerns, or needs. Patients upcoming visits:  No future appointments. Goals Addressed This Visit's Progress  COMPLETED: transition into hospice care (pt-stated) 4/2/19- patient admitted and being actively managed by [facility] home hospice program. HA 
 
3/25/19- spoke with patients family today and all questions answered. Decision was made to transition into hospice care. Verbal order from Dr Melody Burch given and referral faxed to [facility] home hospice who will contact the family today to schedule a meeting. NN will follow up with patient /family in the next two days to ensure hospice has connected and she plans to continue with them.  HA

## 2020-04-10 NOTE — TELEPHONE ENCOUNTER
Confirmed speaking to daughter Roger Hussein, Dr Luci Hughes did put the order in for liquid and pharmacy will not take back the liquid. Advised that Dr Dhruv Sam did this because it is easier to swallow for the patient. Received verbal understanding and thanks as the pill form was very difficult for the patient to swallow.
Is Pt supposed to take liquid now instead of the pill? She was given the liquid in the ER.
patient xray consistent with covid pna. vital stable, except hypoxia. will admit for hypoxia. covid w.u